# Patient Record
Sex: MALE | Race: BLACK OR AFRICAN AMERICAN | Employment: FULL TIME | URBAN - METROPOLITAN AREA
[De-identification: names, ages, dates, MRNs, and addresses within clinical notes are randomized per-mention and may not be internally consistent; named-entity substitution may affect disease eponyms.]

---

## 2017-01-31 ENCOUNTER — TELEPHONE (OUTPATIENT)
Dept: FAMILY MEDICINE CLINIC | Facility: CLINIC | Age: 57
End: 2017-01-31

## 2017-01-31 DIAGNOSIS — H40.1192 MODERATE STAGE CHRONIC OPEN ANGLE GLAUCOMA: Primary | ICD-10-CM

## 2017-01-31 NOTE — TELEPHONE ENCOUNTER
----- Message from [address] [place] [address] [address] Generic sent at 1/31/2017 10:18 AM CST -----  Regarding: RE:Ophthalmology referral  Contact: [phone]  I have an appointment scheduled with Dr. Dariel Louis this Friday 2/3/17 at 9:30 and I need another referral.  ----- Message --

## 2017-04-30 ENCOUNTER — APPOINTMENT (OUTPATIENT)
Dept: GENERAL RADIOLOGY | Age: 57
End: 2017-04-30
Payer: COMMERCIAL

## 2017-04-30 ENCOUNTER — HOSPITAL ENCOUNTER (EMERGENCY)
Age: 57
Discharge: HOME | End: 2017-04-30
Attending: EMERGENCY MEDICINE
Payer: COMMERCIAL

## 2017-04-30 VITALS
OXYGEN SATURATION: 99 % | DIASTOLIC BLOOD PRESSURE: 105 MMHG | HEART RATE: 75 BPM | SYSTOLIC BLOOD PRESSURE: 162 MMHG | WEIGHT: 196 LBS | BODY MASS INDEX: 23.87 KG/M2 | RESPIRATION RATE: 18 BRPM | HEIGHT: 76 IN | TEMPERATURE: 97 F

## 2017-04-30 DIAGNOSIS — M23.91 INTERNAL DERANGEMENT OF KNEE, RIGHT: Primary | ICD-10-CM

## 2017-04-30 PROCEDURE — 99283 EMERGENCY DEPT VISIT LOW MDM: CPT

## 2017-04-30 PROCEDURE — 73562 X-RAY EXAM OF KNEE 3: CPT

## 2017-04-30 RX ORDER — IBUPROFEN 600 MG/1
600 TABLET ORAL EVERY 8 HOURS PRN
Qty: 30 TABLET | Refills: 0 | Status: SHIPPED | OUTPATIENT
Start: 2017-04-30 | End: 2017-05-07

## 2017-04-30 RX ORDER — HYDROCODONE BITARTRATE AND ACETAMINOPHEN 5; 325 MG/1; MG/1
1-2 TABLET ORAL EVERY 4 HOURS PRN
Qty: 30 TABLET | Refills: 0 | Status: SHIPPED | OUTPATIENT
Start: 2017-04-30 | End: 2017-05-07

## 2017-04-30 RX ORDER — HYDROCODONE BITARTRATE AND ACETAMINOPHEN 5; 325 MG/1; MG/1
2 TABLET ORAL ONCE
Status: COMPLETED | OUTPATIENT
Start: 2017-04-30 | End: 2017-04-30

## 2017-05-01 ENCOUNTER — OFFICE VISIT (OUTPATIENT)
Dept: FAMILY MEDICINE CLINIC | Facility: CLINIC | Age: 57
End: 2017-05-01

## 2017-05-01 ENCOUNTER — TELEPHONE (OUTPATIENT)
Dept: FAMILY MEDICINE CLINIC | Facility: CLINIC | Age: 57
End: 2017-05-01

## 2017-05-01 VITALS
SYSTOLIC BLOOD PRESSURE: 138 MMHG | HEIGHT: 75 IN | DIASTOLIC BLOOD PRESSURE: 80 MMHG | HEART RATE: 76 BPM | OXYGEN SATURATION: 99 % | TEMPERATURE: 98 F | RESPIRATION RATE: 12 BRPM | BODY MASS INDEX: 23.38 KG/M2 | WEIGHT: 188 LBS

## 2017-05-01 DIAGNOSIS — M25.561 ACUTE PAIN OF RIGHT KNEE: Primary | ICD-10-CM

## 2017-05-01 PROCEDURE — 99213 OFFICE O/P EST LOW 20 MIN: CPT | Performed by: FAMILY MEDICINE

## 2017-05-01 NOTE — PROGRESS NOTES
HPI:    Patient ID: Femi Gomez is a 49year old male. HPI  Patient presents with:  ER F/U: 4/30- right knee pain    Patient is here for follow-up of ER visit where he was sitting and went to stand and he twisted his right knee and felt a pop.   He had MS: Right knee with no effusion, no tenderness to palpation the anterior posterior lateral aspects knee range of motion with flexion extension moderately limited due to pain and stiffness         ASSESSMENT/PLAN:   Acute pain of right knee  (primary enco

## 2017-05-01 NOTE — TELEPHONE ENCOUNTER
Reason for the order/referral: ED f/u right knee   PCP: Melody Mcardle   Refer to Provider (first and last name): Dr. Daniel Dixon   Specialty: Ortho   Patient Insurance: Payor: Christian Ortiz / Plan: Edgardo Bon / Product Type: HMO /   Duration/Summary o

## 2017-05-01 NOTE — ED PROVIDER NOTES
Patient Seen in: [facility] [place]    History   Patient presents with:  Lower Extremity Injury (musculoskeletal)    Stated Complaint: right knee injury    HPI    61-year-old male presents to the emergency department stating that h 04/30/17 1700 Temporal   SpO2 04/30/17 1700 99 %   O2 Device 04/30/17 1700 None (Room air)       Current:/105 mmHg  Pulse 75  Temp(Src) 97.3 °F (36.3 °C) (Temporal)  Resp 18  Ht 193 cm (6' 4\")  Wt 88.905 kg  BMI 23.87 kg/m2  SpO2 99%        Physical R-0

## 2017-05-02 PROBLEM — S89.91XA KNEE INJURY, RIGHT, INITIAL ENCOUNTER: Status: ACTIVE | Noted: 2017-05-02

## 2017-05-02 PROBLEM — M25.461 KNEE EFFUSION, RIGHT: Status: ACTIVE | Noted: 2017-05-02

## 2017-05-09 ENCOUNTER — OFFICE VISIT (OUTPATIENT)
Dept: FAMILY MEDICINE CLINIC | Facility: CLINIC | Age: 57
End: 2017-05-09

## 2017-05-09 ENCOUNTER — APPOINTMENT (OUTPATIENT)
Dept: LAB | Age: 57
End: 2017-05-09
Attending: FAMILY MEDICINE
Payer: COMMERCIAL

## 2017-05-09 ENCOUNTER — LAB ENCOUNTER (OUTPATIENT)
Dept: LAB | Age: 57
End: 2017-05-09
Attending: FAMILY MEDICINE
Payer: COMMERCIAL

## 2017-05-09 VITALS
TEMPERATURE: 99 F | SYSTOLIC BLOOD PRESSURE: 130 MMHG | OXYGEN SATURATION: 98 % | WEIGHT: 186 LBS | BODY MASS INDEX: 23.13 KG/M2 | HEIGHT: 75 IN | DIASTOLIC BLOOD PRESSURE: 78 MMHG | RESPIRATION RATE: 12 BRPM | HEART RATE: 87 BPM

## 2017-05-09 DIAGNOSIS — M25.461 KNEE EFFUSION, RIGHT: ICD-10-CM

## 2017-05-09 DIAGNOSIS — S89.91XA KNEE INJURY, RIGHT, INITIAL ENCOUNTER: ICD-10-CM

## 2017-05-09 DIAGNOSIS — Z01.818 PRE-OP TESTING: ICD-10-CM

## 2017-05-09 DIAGNOSIS — S83.206A ACUTE MENISCAL TEAR OF KNEE, RIGHT, INITIAL ENCOUNTER: Primary | ICD-10-CM

## 2017-05-09 DIAGNOSIS — Z01.818 PRE-OP TESTING: Primary | ICD-10-CM

## 2017-05-09 PROCEDURE — 85025 COMPLETE CBC W/AUTO DIFF WBC: CPT

## 2017-05-09 PROCEDURE — 36415 COLL VENOUS BLD VENIPUNCTURE: CPT

## 2017-05-09 PROCEDURE — 85730 THROMBOPLASTIN TIME PARTIAL: CPT

## 2017-05-09 PROCEDURE — 85610 PROTHROMBIN TIME: CPT

## 2017-05-09 PROCEDURE — 99214 OFFICE O/P EST MOD 30 MIN: CPT | Performed by: FAMILY MEDICINE

## 2017-05-09 PROCEDURE — 93005 ELECTROCARDIOGRAM TRACING: CPT

## 2017-05-09 PROCEDURE — 80053 COMPREHEN METABOLIC PANEL: CPT

## 2017-05-09 PROCEDURE — 93010 ELECTROCARDIOGRAM REPORT: CPT | Performed by: INTERNAL MEDICINE

## 2017-05-10 RX ORDER — ASCORBIC ACID 500 MG
500 TABLET ORAL DAILY
COMMUNITY

## 2017-05-10 NOTE — PROGRESS NOTES
HPI:    Patient ID: Amber Dave is a 49year old male. HPI  Patient is here for preop evaluation for right knee meniscal tear surgery.   Patient feels well and has no complaints of chest pain shortness of breath or any other concerns except for right surgery. No orders of the defined types were placed in this encounter.        Meds This Visit:  No prescriptions requested or ordered in this encounter    Imaging & Referrals:  None       TANISHA#[id number]

## 2017-05-15 ENCOUNTER — HOSPITAL ENCOUNTER (OUTPATIENT)
Facility: HOSPITAL | Age: 57
Setting detail: HOSPITAL OUTPATIENT SURGERY
Discharge: HOME | End: 2017-05-15
Attending: ORTHOPAEDIC SURGERY | Admitting: ORTHOPAEDIC SURGERY
Payer: COMMERCIAL

## 2017-05-15 ENCOUNTER — SURGERY (OUTPATIENT)
Age: 57
End: 2017-05-15

## 2017-05-15 VITALS
BODY MASS INDEX: 23 KG/M2 | HEIGHT: 75 IN | SYSTOLIC BLOOD PRESSURE: 134 MMHG | HEART RATE: 59 BPM | TEMPERATURE: 98 F | DIASTOLIC BLOOD PRESSURE: 96 MMHG | RESPIRATION RATE: 15 BRPM | WEIGHT: 185 LBS | OXYGEN SATURATION: 100 %

## 2017-05-15 PROCEDURE — 0SBC4ZZ EXCISION OF RIGHT KNEE JOINT, PERCUTANEOUS ENDOSCOPIC APPROACH: ICD-10-PCS | Performed by: ORTHOPAEDIC SURGERY

## 2017-05-15 RX ORDER — SODIUM CHLORIDE, SODIUM LACTATE, POTASSIUM CHLORIDE, CALCIUM CHLORIDE 600; 310; 30; 20 MG/100ML; MG/100ML; MG/100ML; MG/100ML
INJECTION, SOLUTION INTRAVENOUS CONTINUOUS
Status: DISCONTINUED | OUTPATIENT
Start: 2017-05-15 | End: 2017-05-15

## 2017-05-15 RX ORDER — MEPERIDINE HYDROCHLORIDE 25 MG/ML
12.5 INJECTION INTRAMUSCULAR; INTRAVENOUS; SUBCUTANEOUS AS NEEDED
Status: DISCONTINUED | OUTPATIENT
Start: 2017-05-15 | End: 2017-05-15

## 2017-05-15 RX ORDER — BUPIVACAINE HYDROCHLORIDE 5 MG/ML
INJECTION, SOLUTION EPIDURAL; INTRACAUDAL AS NEEDED
Status: DISCONTINUED | OUTPATIENT
Start: 2017-05-15 | End: 2017-05-15 | Stop reason: HOSPADM

## 2017-05-15 RX ORDER — METOCLOPRAMIDE HYDROCHLORIDE 5 MG/ML
10 INJECTION INTRAMUSCULAR; INTRAVENOUS AS NEEDED
Status: DISCONTINUED | OUTPATIENT
Start: 2017-05-15 | End: 2017-05-15

## 2017-05-15 RX ORDER — NALOXONE HYDROCHLORIDE 0.4 MG/ML
80 INJECTION, SOLUTION INTRAMUSCULAR; INTRAVENOUS; SUBCUTANEOUS AS NEEDED
Status: DISCONTINUED | OUTPATIENT
Start: 2017-05-15 | End: 2017-05-15

## 2017-05-15 RX ORDER — DEXAMETHASONE SODIUM PHOSPHATE 4 MG/ML
4 VIAL (ML) INJECTION AS NEEDED
Status: DISCONTINUED | OUTPATIENT
Start: 2017-05-15 | End: 2017-05-15

## 2017-05-15 RX ORDER — LABETALOL HYDROCHLORIDE 5 MG/ML
5 INJECTION, SOLUTION INTRAVENOUS EVERY 5 MIN PRN
Status: DISCONTINUED | OUTPATIENT
Start: 2017-05-15 | End: 2017-05-15

## 2017-05-15 RX ORDER — MIDAZOLAM HYDROCHLORIDE 1 MG/ML
1 INJECTION INTRAMUSCULAR; INTRAVENOUS EVERY 5 MIN PRN
Status: DISCONTINUED | OUTPATIENT
Start: 2017-05-15 | End: 2017-05-15

## 2017-05-15 RX ORDER — ACETAMINOPHEN 500 MG
1000 TABLET ORAL ONCE AS NEEDED
Status: DISCONTINUED | OUTPATIENT
Start: 2017-05-15 | End: 2017-05-15

## 2017-05-15 RX ORDER — HYDROCODONE BITARTRATE AND ACETAMINOPHEN 5; 325 MG/1; MG/1
2 TABLET ORAL AS NEEDED
Status: DISCONTINUED | OUTPATIENT
Start: 2017-05-15 | End: 2017-05-15

## 2017-05-15 RX ORDER — HYDROCODONE BITARTRATE AND ACETAMINOPHEN 5; 325 MG/1; MG/1
1 TABLET ORAL AS NEEDED
Status: DISCONTINUED | OUTPATIENT
Start: 2017-05-15 | End: 2017-05-15

## 2017-05-15 RX ORDER — DIPHENHYDRAMINE HYDROCHLORIDE 50 MG/ML
12.5 INJECTION INTRAMUSCULAR; INTRAVENOUS AS NEEDED
Status: DISCONTINUED | OUTPATIENT
Start: 2017-05-15 | End: 2017-05-15

## 2017-05-15 RX ORDER — HYDROMORPHONE HYDROCHLORIDE 1 MG/ML
0.4 INJECTION, SOLUTION INTRAMUSCULAR; INTRAVENOUS; SUBCUTANEOUS EVERY 5 MIN PRN
Status: DISCONTINUED | OUTPATIENT
Start: 2017-05-15 | End: 2017-05-15

## 2017-05-15 RX ORDER — ONDANSETRON 2 MG/ML
4 INJECTION INTRAMUSCULAR; INTRAVENOUS AS NEEDED
Status: DISCONTINUED | OUTPATIENT
Start: 2017-05-15 | End: 2017-05-15

## 2017-05-15 NOTE — INTERVAL H&P NOTE
Pre-op Diagnosis: RIGHT KNEE MEDIAL MENISCUS TEAR    The above referenced H&P was reviewed by Tereasa Dakin, MD on 5/15/2017, the patient was examined and no significant changes have occurred in the patient's condition since the H&P was performed.   I dis

## 2017-05-15 NOTE — BRIEF OP NOTE
Pre-Operative Diagnosis: RIGHT KNEE MEDIAL MENISCUS TEAR     Post-Operative Diagnosis: RIGHT KNEE MEDIAL MENISCUS TEAR     Procedure Performed:   Procedure(s):  RIGHT KNEE ARTHROSCOPY WITH PARTIAL LATERAL MENISCECTOMY    Surgeon(s) and Role:     * Jamarcus

## 2017-05-15 NOTE — OPERATIVE REPORT
[facility]    PATIENT'S NAME: Theodore Moralessal   ATTENDING PHYSICIAN: Yobany Garvey M.D. OPERATING PHYSICIAN: Yobany Garvey M.D.    PATIENT ACCOUNT#:   [de-identified]    LOCATION:  OR  OR POOL ROOMS 1 EDWP 10  MEDICAL RECORD #:   [id number]       DA postanesthesia care in stable condition.     Dictated By Emily Chris M.D.  d: 05/15/2017 07:57:04  t: 05/15/2017 08:03:02  [facility] [id number]  [facility]/

## 2017-05-31 ENCOUNTER — OFFICE VISIT (OUTPATIENT)
Dept: PHYSICAL THERAPY | Age: 57
End: 2017-05-31
Attending: ORTHOPAEDIC SURGERY
Payer: COMMERCIAL

## 2017-05-31 DIAGNOSIS — M25.461 EFFUSION OF RIGHT KNEE JOINT: Primary | ICD-10-CM

## 2017-05-31 PROCEDURE — 97530 THERAPEUTIC ACTIVITIES: CPT

## 2017-05-31 PROCEDURE — 97161 PT EVAL LOW COMPLEX 20 MIN: CPT

## 2017-05-31 NOTE — PROGRESS NOTES
LOWER EXTREMITY EVALUATION:   Referring Physician:     Diagnosis: Right Knee Arthroscope     Date of Service: 5/31/2017     PATIENT SUMMARY   Myrna Jimenez is a 49year old y/o male who presents to therapy today with complaints of right sided kn palpation at the incisions. Has a generalized increase in edema and effusion. Sensation: Intact bilaterally. AROM: Active and passive ROM is full for all planes of motion. Accessory motion: Mild loss of medial patellar glides.      Flexibility: Dept: [phone]    Sincerely,  Electronically signed by therapist:     Jean León.  Lana Galeana, PT, DPT, FAAOMPT

## 2017-06-01 ENCOUNTER — APPOINTMENT (OUTPATIENT)
Dept: PHYSICAL THERAPY | Age: 57
End: 2017-06-01
Attending: ORTHOPAEDIC SURGERY
Payer: COMMERCIAL

## 2017-06-05 ENCOUNTER — OFFICE VISIT (OUTPATIENT)
Dept: PHYSICAL THERAPY | Age: 57
End: 2017-06-05
Attending: ORTHOPAEDIC SURGERY
Payer: COMMERCIAL

## 2017-06-05 PROCEDURE — 97110 THERAPEUTIC EXERCISES: CPT

## 2017-06-05 PROCEDURE — 97140 MANUAL THERAPY 1/> REGIONS: CPT

## 2017-06-05 PROCEDURE — 97112 NEUROMUSCULAR REEDUCATION: CPT

## 2017-06-05 NOTE — PROGRESS NOTES
Dx: Right knee arthroscopy         Authorized # of Visits:  8         Next MD visit: none scheduled  Fall Risk: standard         Precautions: n/a             Subjective: The patient states that the knee pain is low.   Cannot recall the HEP we issued last se

## 2017-06-07 ENCOUNTER — OFFICE VISIT (OUTPATIENT)
Dept: PHYSICAL THERAPY | Age: 57
End: 2017-06-07
Attending: ORTHOPAEDIC SURGERY
Payer: COMMERCIAL

## 2017-06-07 PROCEDURE — 97112 NEUROMUSCULAR REEDUCATION: CPT

## 2017-06-07 PROCEDURE — 97140 MANUAL THERAPY 1/> REGIONS: CPT

## 2017-06-07 PROCEDURE — 97110 THERAPEUTIC EXERCISES: CPT

## 2017-06-07 NOTE — PROGRESS NOTES
Dx: Right knee arthroscopy         Authorized # of Visits:  8         Next MD visit: none scheduled  Fall Risk: standard         Precautions: n/a             Subjective: The patient states that the knee pain is low.   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 Plan:Await to hear from the patient. Charges: 2 TherEx; 1 Neuro Clark (10 min);  1 Manual PT (10 min)      Total Timed Treatment: 45 min  Total Treatment Time: 45 min

## 2017-09-29 ENCOUNTER — LAB ENCOUNTER (OUTPATIENT)
Dept: LAB | Age: 57
End: 2017-09-29
Attending: FAMILY MEDICINE
Payer: COMMERCIAL

## 2017-09-29 ENCOUNTER — OFFICE VISIT (OUTPATIENT)
Dept: FAMILY MEDICINE CLINIC | Facility: CLINIC | Age: 57
End: 2017-09-29

## 2017-09-29 VITALS
HEIGHT: 75 IN | HEART RATE: 68 BPM | SYSTOLIC BLOOD PRESSURE: 120 MMHG | RESPIRATION RATE: 16 BRPM | DIASTOLIC BLOOD PRESSURE: 80 MMHG | BODY MASS INDEX: 23.62 KG/M2 | TEMPERATURE: 98 F | WEIGHT: 190 LBS

## 2017-09-29 DIAGNOSIS — N52.9 ERECTILE DYSFUNCTION, UNSPECIFIED ERECTILE DYSFUNCTION TYPE: ICD-10-CM

## 2017-09-29 DIAGNOSIS — Z00.00 ROUTINE ADULT HEALTH MAINTENANCE: ICD-10-CM

## 2017-09-29 DIAGNOSIS — Z00.00 ROUTINE ADULT HEALTH MAINTENANCE: Primary | ICD-10-CM

## 2017-09-29 DIAGNOSIS — Z11.59 NEED FOR HEPATITIS C SCREENING TEST: ICD-10-CM

## 2017-09-29 PROCEDURE — 80053 COMPREHEN METABOLIC PANEL: CPT

## 2017-09-29 PROCEDURE — 86803 HEPATITIS C AB TEST: CPT

## 2017-09-29 PROCEDURE — 85025 COMPLETE CBC W/AUTO DIFF WBC: CPT

## 2017-09-29 PROCEDURE — 80061 LIPID PANEL: CPT

## 2017-09-29 PROCEDURE — 84402 ASSAY OF FREE TESTOSTERONE: CPT

## 2017-09-29 PROCEDURE — 84153 ASSAY OF PSA TOTAL: CPT

## 2017-09-29 PROCEDURE — 99396 PREV VISIT EST AGE 40-64: CPT | Performed by: FAMILY MEDICINE

## 2017-09-29 PROCEDURE — 84403 ASSAY OF TOTAL TESTOSTERONE: CPT

## 2017-09-29 PROCEDURE — 84439 ASSAY OF FREE THYROXINE: CPT

## 2017-09-29 PROCEDURE — 36415 COLL VENOUS BLD VENIPUNCTURE: CPT

## 2017-09-29 PROCEDURE — 84443 ASSAY THYROID STIM HORMONE: CPT

## 2017-10-02 NOTE — PROGRESS NOTES
Des Cowart is a 56year old male who presents for a complete physical exam.   HPI:   Pt complains of nothing.   Urination changes no  ED symptoms no  Immunizations needed no  Wt Readings from Last 6 Encounters:  09/29/17 : 190 lb  05/15/17 : 185 lb  05/ feels well overall, denies fever or chills, denies change in weight  SKIN: denies any unusual skin lesions  EYES: denies changes in vision  HENT: denies upper respiratory symptoms  LUNGS: denies FARHAN, wheezing, cough, orthopnea and PND  CARDIOVASCULAR: sarah edema  NEURO: A&O x3, CN II-XII grossly intact. Reflexes: biceps and patellar 2+ b/l and symmetric.  Gait is normal.  PSYCH: normal affect, no apparent thought disorder, average judgement and insight      Immunization History  Administered            Date(s

## 2017-10-09 ENCOUNTER — TELEPHONE (OUTPATIENT)
Dept: FAMILY MEDICINE CLINIC | Facility: CLINIC | Age: 57
End: 2017-10-09

## 2017-10-09 DIAGNOSIS — D64.9 LOW HEMOGLOBIN: Primary | ICD-10-CM

## 2017-10-09 NOTE — TELEPHONE ENCOUNTER
Spoke to pt regarding lab results in detail. Referral placed to [address] [place] [address] per dr. Sabina Gayle recommendations. All questions answered.

## 2017-10-18 ENCOUNTER — APPOINTMENT (OUTPATIENT)
Dept: HEMATOLOGY/ONCOLOGY | Age: 57
End: 2017-10-18
Attending: INTERNAL MEDICINE
Payer: COMMERCIAL

## 2017-10-19 ENCOUNTER — OFFICE VISIT (OUTPATIENT)
Dept: HEMATOLOGY/ONCOLOGY | Age: 57
End: 2017-10-19
Attending: INTERNAL MEDICINE
Payer: COMMERCIAL

## 2017-10-19 DIAGNOSIS — D64.9 ANEMIA, UNSPECIFIED TYPE: Primary | ICD-10-CM

## 2017-10-19 PROCEDURE — 99244 OFF/OP CNSLTJ NEW/EST MOD 40: CPT | Performed by: INTERNAL MEDICINE

## 2017-10-19 NOTE — CONSULTS
Cancer Center Report of Consultation    Patient Name: Mary Lou Dillon   YOB: 1960   Medical Record Number: [id number]   CSN: [id number]   Consulting Physician: William Mathis M.D.    Referring Physician: Charmayne Devoid    Date of Consultat Travoprost, LUISANA Free, (TRAVATAN Z) 0.004 % Ophthalmic Solution, Apply 1 drop to eye nightly., Disp: 1 Bottle, Rfl: 6  •  latanoprost 0.005 % Ophthalmic Solution, Place 1 drop into both eyes nightly., Disp: 2.5 mL, Rfl: 5  •  Vitamin C 500 MG Oral Tab, Take appropriate. Appears close to chronological age. Well nourished. Well developed. Eyes Normal - Conjunctivae and sclerae are clear and without icterus. Pupils are reactive and equal.   ENMT Normal - Sinuses are nontender.   No oral exudates, ulcers, masses Range: 80.0 - 99.0 fL 86.1 88.4 87.5 86.4   RDW Latest Ref Range: 11.5 - 16.0 % 14.6 14.5 14.5 14.5   RDW-SD Latest Ref Range: 35.1 - 46.3 fL  46.7 (H) 46.6 (H) 46.1   MPV Latest Ref Range: 7.5 - 11.5 fL 7.8      Prelim Neutrophil Abs Latest Ref Range: 1.3 46 48   AST (SGOT) Latest Ref Range: 15 - 41 U/L 17 21   ALT (SGPT) Latest Ref Range: 17 - 63 U/L 20 19   Total Bilirubin Latest Ref Range: 0.1 - 2.0 mg/dL 0.2 0.5       Radiology:    Pathology:    Impression and Plan:  Anemia: Unclear etiology.  The patien

## 2017-10-30 ENCOUNTER — TELEPHONE (OUTPATIENT)
Dept: FAMILY MEDICINE CLINIC | Facility: CLINIC | Age: 57
End: 2017-10-30

## 2017-10-30 DIAGNOSIS — D64.9 LOW HEMOGLOBIN: Primary | ICD-10-CM

## 2017-10-30 NOTE — TELEPHONE ENCOUNTER
Referral placed, tried calling Lorena at the cancer center, phones already in answering service, will try again.

## 2017-10-30 NOTE — TELEPHONE ENCOUNTER
Called from the [facility]. Patient will need referral for Dr. Anika Gant for a follow-up. Patient has an appt this Thursday, 11-2-17.

## 2017-11-02 ENCOUNTER — OFFICE VISIT (OUTPATIENT)
Dept: HEMATOLOGY/ONCOLOGY | Age: 57
End: 2017-11-02
Attending: INTERNAL MEDICINE
Payer: COMMERCIAL

## 2017-11-02 VITALS
WEIGHT: 186 LBS | DIASTOLIC BLOOD PRESSURE: 89 MMHG | TEMPERATURE: 98 F | BODY MASS INDEX: 23 KG/M2 | HEART RATE: 90 BPM | SYSTOLIC BLOOD PRESSURE: 137 MMHG | RESPIRATION RATE: 18 BRPM

## 2017-11-02 DIAGNOSIS — D64.9 LOW HEMOGLOBIN: ICD-10-CM

## 2017-11-02 PROCEDURE — 99213 OFFICE O/P EST LOW 20 MIN: CPT | Performed by: INTERNAL MEDICINE

## 2017-11-02 NOTE — PATIENT INSTRUCTIONS
Contact Information    Monday-Friday 8:30-4:00   Dr. Rajan Castañeda [phone]    After hours and on Weekends:   Emergency number  [place]     To make an Appointment with Dr. Swathi Bhatia [phone]

## 2018-08-01 ENCOUNTER — TELEPHONE (OUTPATIENT)
Dept: FAMILY MEDICINE CLINIC | Facility: CLINIC | Age: 58
End: 2018-08-01

## 2018-08-01 DIAGNOSIS — H40.1192 MODERATE STAGE CHRONIC OPEN ANGLE GLAUCOMA, UNSPECIFIED LATERALITY: Primary | ICD-10-CM

## 2018-08-01 NOTE — TELEPHONE ENCOUNTER
Pt needs referral for eye dr, Dr Demarco Bundy out of [facility].  He has an appt on Friday

## 2018-08-01 NOTE — TELEPHONE ENCOUNTER
Requesting Referral to regular eye doctor  LOV: 9/29/17  RTC: not noted    Future Appointments  Date Time Provider Zach Tricia   8/3/2018 11:00 AM Ngoc Aragon MD [facility] [place]   8/31/2018 7:00 AM Parris Roa MD [facility] [address]

## 2018-08-31 ENCOUNTER — LAB ENCOUNTER (OUTPATIENT)
Dept: LAB | Age: 58
End: 2018-08-31
Attending: FAMILY MEDICINE
Payer: COMMERCIAL

## 2018-08-31 ENCOUNTER — OFFICE VISIT (OUTPATIENT)
Dept: FAMILY MEDICINE CLINIC | Facility: CLINIC | Age: 58
End: 2018-08-31

## 2018-08-31 VITALS
BODY MASS INDEX: 21.51 KG/M2 | WEIGHT: 173 LBS | SYSTOLIC BLOOD PRESSURE: 122 MMHG | HEART RATE: 70 BPM | RESPIRATION RATE: 16 BRPM | DIASTOLIC BLOOD PRESSURE: 82 MMHG | HEIGHT: 75 IN

## 2018-08-31 DIAGNOSIS — N52.9 ERECTILE DYSFUNCTION, UNSPECIFIED ERECTILE DYSFUNCTION TYPE: ICD-10-CM

## 2018-08-31 DIAGNOSIS — Z12.5 PROSTATE CANCER SCREENING: ICD-10-CM

## 2018-08-31 DIAGNOSIS — Z00.00 LABORATORY EXAMINATION ORDERED AS PART OF A ROUTINE GENERAL MEDICAL EXAMINATION: ICD-10-CM

## 2018-08-31 DIAGNOSIS — R39.198 SUBJECTIVE CHANGE IN URINATION: ICD-10-CM

## 2018-08-31 DIAGNOSIS — Z00.00 WELLNESS EXAMINATION: Primary | ICD-10-CM

## 2018-08-31 DIAGNOSIS — Z99.89 OSA ON CPAP: ICD-10-CM

## 2018-08-31 DIAGNOSIS — G47.33 OSA ON CPAP: ICD-10-CM

## 2018-08-31 DIAGNOSIS — R39.11 HESITANCY: ICD-10-CM

## 2018-08-31 DIAGNOSIS — D64.9 NORMOCYTIC ANEMIA: ICD-10-CM

## 2018-08-31 PROBLEM — M25.461 KNEE EFFUSION, RIGHT: Status: RESOLVED | Noted: 2017-05-02 | Resolved: 2018-08-31

## 2018-08-31 PROBLEM — S89.91XA KNEE INJURY, RIGHT, INITIAL ENCOUNTER: Status: RESOLVED | Noted: 2017-05-02 | Resolved: 2018-08-31

## 2018-08-31 LAB
ALBUMIN SERPL-MCNC: 3.7 G/DL (ref 3.5–4.8)
ALBUMIN/GLOB SERPL: 1.1 {RATIO} (ref 1–2)
ALP LIVER SERPL-CCNC: 52 U/L (ref 45–117)
ALT SERPL-CCNC: 25 U/L (ref 17–63)
ANION GAP SERPL CALC-SCNC: 8 MMOL/L (ref 0–18)
AST SERPL-CCNC: 26 U/L (ref 15–41)
BASOPHILS # BLD AUTO: 0.08 X10(3) UL (ref 0–0.1)
BASOPHILS NFR BLD AUTO: 1.1 %
BILIRUB SERPL-MCNC: 0.6 MG/DL (ref 0.1–2)
BUN BLD-MCNC: 12 MG/DL (ref 8–20)
BUN/CREAT SERPL: 12 (ref 10–20)
CALCIUM BLD-MCNC: 8.8 MG/DL (ref 8.3–10.3)
CHLORIDE SERPL-SCNC: 108 MMOL/L (ref 101–111)
CHOLEST SMN-MCNC: 124 MG/DL (ref ?–200)
CO2 SERPL-SCNC: 26 MMOL/L (ref 22–32)
COMPLEXED PSA SERPL-MCNC: 0.74 NG/ML (ref 0.01–4)
CREAT BLD-MCNC: 1 MG/DL (ref 0.7–1.3)
DEPRECATED HBV CORE AB SER IA-ACNC: 59.6 NG/ML (ref 30–530)
EOSINOPHIL # BLD AUTO: 0.2 X10(3) UL (ref 0–0.3)
EOSINOPHIL NFR BLD AUTO: 2.7 %
ERYTHROCYTE [DISTWIDTH] IN BLOOD BY AUTOMATED COUNT: 14.2 % (ref 11.5–16)
GLOBULIN PLAS-MCNC: 3.5 G/DL (ref 2.5–4)
GLUCOSE BLD-MCNC: 73 MG/DL (ref 70–99)
HCT VFR BLD AUTO: 40.3 % (ref 37–53)
HDLC SERPL-MCNC: 75 MG/DL (ref 40–59)
HGB BLD-MCNC: 13.2 G/DL (ref 13–17)
IMMATURE GRANULOCYTE COUNT: 0.03 X10(3) UL (ref 0–1)
IMMATURE GRANULOCYTE RATIO %: 0.4 %
IRON SATURATION: 18 % (ref 20–50)
IRON: 76 UG/DL (ref 45–182)
LDLC SERPL CALC-MCNC: 42 MG/DL (ref ?–100)
LYMPHOCYTES # BLD AUTO: 2.61 X10(3) UL (ref 0.9–4)
LYMPHOCYTES NFR BLD AUTO: 34.9 %
M PROTEIN MFR SERPL ELPH: 7.2 G/DL (ref 6.1–8.3)
MCH RBC QN AUTO: 29 PG (ref 27–33.2)
MCHC RBC AUTO-ENTMCNC: 32.8 G/DL (ref 31–37)
MCV RBC AUTO: 88.6 FL (ref 80–99)
MONOCYTES # BLD AUTO: 0.69 X10(3) UL (ref 0.1–1)
MONOCYTES NFR BLD AUTO: 9.2 %
NEUTROPHIL ABS PRELIM: 3.87 X10 (3) UL (ref 1.3–6.7)
NEUTROPHILS # BLD AUTO: 3.87 X10(3) UL (ref 1.3–6.7)
NEUTROPHILS NFR BLD AUTO: 51.7 %
NONHDLC SERPL-MCNC: 49 MG/DL (ref ?–130)
OSMOLALITY SERPL CALC.SUM OF ELEC: 292 MOSM/KG (ref 275–295)
PLATELET # BLD AUTO: 238 10(3)UL (ref 150–450)
POTASSIUM SERPL-SCNC: 4.3 MMOL/L (ref 3.6–5.1)
RBC # BLD AUTO: 4.55 X10(6)UL (ref 4.3–5.7)
RED CELL DISTRIBUTION WIDTH-SD: 45.8 FL (ref 35.1–46.3)
SODIUM SERPL-SCNC: 142 MMOL/L (ref 136–144)
TOTAL IRON BINDING CAPACITY: 423 UG/DL (ref 240–450)
TRANSFERRIN SERPL-MCNC: 284 MG/DL (ref 200–360)
TRIGL SERPL-MCNC: 37 MG/DL (ref 30–149)
VLDLC SERPL CALC-MCNC: 7 MG/DL (ref 0–30)
WBC # BLD AUTO: 7.5 X10(3) UL (ref 4–13)

## 2018-08-31 PROCEDURE — 82728 ASSAY OF FERRITIN: CPT

## 2018-08-31 PROCEDURE — 84402 ASSAY OF FREE TESTOSTERONE: CPT

## 2018-08-31 PROCEDURE — 83540 ASSAY OF IRON: CPT

## 2018-08-31 PROCEDURE — 83550 IRON BINDING TEST: CPT

## 2018-08-31 PROCEDURE — 36415 COLL VENOUS BLD VENIPUNCTURE: CPT

## 2018-08-31 PROCEDURE — 80053 COMPREHEN METABOLIC PANEL: CPT

## 2018-08-31 PROCEDURE — 80061 LIPID PANEL: CPT

## 2018-08-31 PROCEDURE — 84403 ASSAY OF TOTAL TESTOSTERONE: CPT

## 2018-08-31 PROCEDURE — 99396 PREV VISIT EST AGE 40-64: CPT | Performed by: FAMILY MEDICINE

## 2018-08-31 PROCEDURE — 99214 OFFICE O/P EST MOD 30 MIN: CPT | Performed by: FAMILY MEDICINE

## 2018-08-31 PROCEDURE — 85025 COMPLETE CBC W/AUTO DIFF WBC: CPT

## 2018-08-31 NOTE — PROGRESS NOTES
Patient presents with:  Wellness Visit      HPI:  Thao Munguia is a 56year old male here today for preventative visit.       Imms- up to date, declines pneum shot, says he'll work on stopping smoking first.      Prostate cancer screening- due, + hesitenc Cap Take 200 Units by mouth daily. Disp:  Rfl:    [DISCONTINUED] Travoprost, LUISANA Free, (TRAVATAN Z) 0.004 % Ophthalmic Solution Apply 1 drop to eye nightly. Disp: 1 Bottle Rfl: 6     No current facility-administered medications on file prior to visit.    No nerves 2-12 grossly intact. PSYCHIATRIC:  Normal mood, affect, and hygiene. : normal prostate size without nodules, normal rectal tone    ASSESSMENT/PLAN:    1. Wellness examination    2.  Laboratory examination ordered as part of a routine general med

## 2018-09-05 ENCOUNTER — TELEPHONE (OUTPATIENT)
Dept: FAMILY MEDICINE CLINIC | Facility: CLINIC | Age: 58
End: 2018-09-05

## 2018-09-05 DIAGNOSIS — Z99.89 OSA ON CPAP: Primary | ICD-10-CM

## 2018-09-05 DIAGNOSIS — G47.33 OSA ON CPAP: Primary | ICD-10-CM

## 2018-09-05 NOTE — TELEPHONE ENCOUNTER
Renee Nelson is aware of new order and pt has been scheduled   Future Appointments  Date Time Provider Zach Tricia   9/6/2018 9:00 PM [address] [facility]   2/8/2019 10:00 AM Nika Chowdhury MD [facility]

## 2018-09-05 NOTE — TELEPHONE ENCOUNTER
Melvina Machuca called from the sleep center and stated the provider needs to rewrite the order for the sleep study as it was written incorrectly.  Its states CPAP titration, but pt has not had a sleep study in over 5 years so order needs to start at Diagnostic sleep

## 2018-09-06 ENCOUNTER — OFFICE VISIT (OUTPATIENT)
Dept: SLEEP CENTER | Age: 58
End: 2018-09-06
Attending: FAMILY MEDICINE
Payer: COMMERCIAL

## 2018-09-06 DIAGNOSIS — G47.33 OSA ON CPAP: ICD-10-CM

## 2018-09-06 DIAGNOSIS — Z99.89 OSA ON CPAP: ICD-10-CM

## 2018-09-06 LAB
TESTOSTERONE TOTAL: 403 NG/DL
TESTOSTERONE, FREE -MS/MS: 60.7 PG/ML

## 2018-09-06 PROCEDURE — 95810 POLYSOM 6/> YRS 4/> PARAM: CPT

## 2018-09-13 NOTE — PROCEDURES
[address] [address]       Accredited by the [employer] ([employer]ASM)    PATIENT'S NAME:        Kayla Levi  ATTENDING PHYSICIAN:   Mary Andre M.D. REFERRING PHYSICIAN:   Percy Davidson  stage REM 15%. RESPIRATORY MEASURES:  The patient had a total of 31 obstructive apneic or hypopneic episodes, for an overall apnea-hypopnea index of 6. Supine AHI was 29, lateral AHI was 3. Oxygen saturation zeus was 89%.   The patient also had freque

## 2019-11-13 ENCOUNTER — OFFICE VISIT (OUTPATIENT)
Dept: FAMILY MEDICINE CLINIC | Facility: CLINIC | Age: 59
End: 2019-11-13
Payer: COMMERCIAL

## 2019-11-13 ENCOUNTER — LAB ENCOUNTER (OUTPATIENT)
Dept: LAB | Age: 59
End: 2019-11-13
Attending: FAMILY MEDICINE
Payer: COMMERCIAL

## 2019-11-13 VITALS
BODY MASS INDEX: 22.88 KG/M2 | WEIGHT: 184 LBS | HEIGHT: 75 IN | HEART RATE: 72 BPM | DIASTOLIC BLOOD PRESSURE: 78 MMHG | RESPIRATION RATE: 16 BRPM | TEMPERATURE: 98 F | SYSTOLIC BLOOD PRESSURE: 120 MMHG

## 2019-11-13 DIAGNOSIS — Z00.00 LABORATORY EXAM ORDERED AS PART OF ROUTINE GENERAL MEDICAL EXAMINATION: ICD-10-CM

## 2019-11-13 DIAGNOSIS — N40.1 BENIGN PROSTATIC HYPERPLASIA WITH URINARY HESITANCY: ICD-10-CM

## 2019-11-13 DIAGNOSIS — Z86.010 HISTORY OF COLON POLYPS: ICD-10-CM

## 2019-11-13 DIAGNOSIS — H40.9 GLAUCOMA OF BOTH EYES, UNSPECIFIED GLAUCOMA TYPE: ICD-10-CM

## 2019-11-13 DIAGNOSIS — Z12.5 SCREENING PSA (PROSTATE SPECIFIC ANTIGEN): ICD-10-CM

## 2019-11-13 DIAGNOSIS — F17.210 CIGARETTE SMOKER MOTIVATED TO QUIT: ICD-10-CM

## 2019-11-13 DIAGNOSIS — N52.9 ERECTILE DYSFUNCTION, UNSPECIFIED ERECTILE DYSFUNCTION TYPE: ICD-10-CM

## 2019-11-13 DIAGNOSIS — R39.11 BENIGN PROSTATIC HYPERPLASIA WITH URINARY HESITANCY: ICD-10-CM

## 2019-11-13 DIAGNOSIS — R39.11 URINARY HESITANCY: ICD-10-CM

## 2019-11-13 DIAGNOSIS — Z00.00 ROUTINE MEDICAL EXAM: Primary | ICD-10-CM

## 2019-11-13 DIAGNOSIS — M21.611 BUNION OF RIGHT FOOT: ICD-10-CM

## 2019-11-13 DIAGNOSIS — Z28.21 INFLUENZA VACCINATION DECLINED BY PATIENT: ICD-10-CM

## 2019-11-13 DIAGNOSIS — M79.671 FOOT PAIN, BILATERAL: ICD-10-CM

## 2019-11-13 DIAGNOSIS — M79.672 FOOT PAIN, BILATERAL: ICD-10-CM

## 2019-11-13 PROCEDURE — 84153 ASSAY OF PSA TOTAL: CPT | Performed by: FAMILY MEDICINE

## 2019-11-13 PROCEDURE — 99213 OFFICE O/P EST LOW 20 MIN: CPT | Performed by: FAMILY MEDICINE

## 2019-11-13 PROCEDURE — 85025 COMPLETE CBC W/AUTO DIFF WBC: CPT | Performed by: FAMILY MEDICINE

## 2019-11-13 PROCEDURE — 80061 LIPID PANEL: CPT | Performed by: FAMILY MEDICINE

## 2019-11-13 PROCEDURE — 36415 COLL VENOUS BLD VENIPUNCTURE: CPT | Performed by: FAMILY MEDICINE

## 2019-11-13 PROCEDURE — 99396 PREV VISIT EST AGE 40-64: CPT | Performed by: FAMILY MEDICINE

## 2019-11-13 PROCEDURE — 80053 COMPREHEN METABOLIC PANEL: CPT | Performed by: FAMILY MEDICINE

## 2019-11-13 RX ORDER — TADALAFIL 5 MG/1
5 TABLET ORAL
Qty: 30 TABLET | Refills: 5 | Status: SHIPPED | OUTPATIENT
Start: 2019-11-13 | End: 2020-11-24

## 2019-11-13 NOTE — PROGRESS NOTES
Patient presents with:  Physical  Imm/Inj: declines Influenza vaccine today      HPI:  Aide Stephenson is a 64year old male here today for preventative visit.         Imms- up to date with tdap, declines pneum shot, says he'll work on stopping smoking firs Solution, Apply 1 drop to eye nightly., Disp: 1 Bottle, Rfl: 6  Vitamin C 500 MG Oral Tab, Take 500 mg by mouth daily. , Disp: , Rfl:   Vitamin B-12 250 MCG Oral Tab, Take 250 mcg by mouth daily. , Disp: , Rfl:   vitamin E 200 UNITS Oral Cap, Take 200 Units activity: Not on file    Other Topics      Concerns:         Service: Not Asked        Blood Transfusions: Not Asked        Caffeine Concern: Yes          3 cups per day        Occupational Exposure: Not Asked        Hobby Hazards: Not Asked intact. PSYCHIATRIC:  Normal mood, affect, and hygiene. ASSESSMENT/PLAN:    1. Routine medical exam    2. Screening PSA (prostate specific antigen)  - PSA SCREEN; Future    3.  Laboratory exam ordered as part of routine general medical examination  - C

## 2020-10-06 ENCOUNTER — APPOINTMENT (OUTPATIENT)
Dept: LAB | Age: 60
End: 2020-10-06
Attending: INTERNAL MEDICINE
Payer: COMMERCIAL

## 2020-10-06 DIAGNOSIS — Z01.818 PRE-OP TESTING: ICD-10-CM

## 2020-10-09 PROBLEM — D12.5 BENIGN NEOPLASM OF SIGMOID COLON: Status: ACTIVE | Noted: 2020-10-09

## 2020-10-09 PROBLEM — Z86.0101 HISTORY OF ADENOMATOUS POLYP OF COLON: Status: ACTIVE | Noted: 2020-10-09

## 2020-10-09 PROBLEM — D12.3 BENIGN NEOPLASM OF TRANSVERSE COLON: Status: ACTIVE | Noted: 2020-10-09

## 2020-10-09 PROBLEM — Z86.010 HISTORY OF ADENOMATOUS POLYP OF COLON: Status: ACTIVE | Noted: 2020-10-09

## 2020-11-24 ENCOUNTER — OFFICE VISIT (OUTPATIENT)
Dept: FAMILY MEDICINE CLINIC | Facility: CLINIC | Age: 60
End: 2020-11-24
Payer: COMMERCIAL

## 2020-11-24 VITALS
WEIGHT: 182 LBS | RESPIRATION RATE: 16 BRPM | OXYGEN SATURATION: 98 % | HEART RATE: 78 BPM | HEIGHT: 75 IN | DIASTOLIC BLOOD PRESSURE: 80 MMHG | TEMPERATURE: 98 F | SYSTOLIC BLOOD PRESSURE: 130 MMHG | BODY MASS INDEX: 22.63 KG/M2

## 2020-11-24 DIAGNOSIS — Z28.21 INFLUENZA VACCINATION DECLINED BY PATIENT: ICD-10-CM

## 2020-11-24 DIAGNOSIS — Z12.11 COLON CANCER SCREENING: ICD-10-CM

## 2020-11-24 DIAGNOSIS — N52.9 ERECTILE DYSFUNCTION, UNSPECIFIED ERECTILE DYSFUNCTION TYPE: ICD-10-CM

## 2020-11-24 DIAGNOSIS — Z00.00 ROUTINE MEDICAL EXAM: Primary | ICD-10-CM

## 2020-11-24 DIAGNOSIS — M79.671 FOOT PAIN, BILATERAL: ICD-10-CM

## 2020-11-24 DIAGNOSIS — Z00.00 LABORATORY EXAM ORDERED AS PART OF ROUTINE GENERAL MEDICAL EXAMINATION: ICD-10-CM

## 2020-11-24 DIAGNOSIS — M21.611 BUNION OF RIGHT FOOT: ICD-10-CM

## 2020-11-24 DIAGNOSIS — R39.11 BENIGN PROSTATIC HYPERPLASIA WITH URINARY HESITANCY: ICD-10-CM

## 2020-11-24 DIAGNOSIS — Z72.0 SMOKING TRYING TO QUIT: ICD-10-CM

## 2020-11-24 DIAGNOSIS — N40.1 BENIGN PROSTATIC HYPERPLASIA WITH URINARY HESITANCY: ICD-10-CM

## 2020-11-24 DIAGNOSIS — Z12.5 PROSTATE CANCER SCREENING: ICD-10-CM

## 2020-11-24 DIAGNOSIS — R39.11 URINARY HESITANCY: ICD-10-CM

## 2020-11-24 DIAGNOSIS — M79.672 FOOT PAIN, BILATERAL: ICD-10-CM

## 2020-11-24 DIAGNOSIS — H40.9 GLAUCOMA OF BOTH EYES, UNSPECIFIED GLAUCOMA TYPE: ICD-10-CM

## 2020-11-24 DIAGNOSIS — Z80.42 FAMILY HISTORY OF PROSTATE CANCER: ICD-10-CM

## 2020-11-24 PROCEDURE — 99072 ADDL SUPL MATRL&STAF TM PHE: CPT | Performed by: FAMILY MEDICINE

## 2020-11-24 PROCEDURE — 3079F DIAST BP 80-89 MM HG: CPT | Performed by: FAMILY MEDICINE

## 2020-11-24 PROCEDURE — 99406 BEHAV CHNG SMOKING 3-10 MIN: CPT | Performed by: FAMILY MEDICINE

## 2020-11-24 PROCEDURE — 99396 PREV VISIT EST AGE 40-64: CPT | Performed by: FAMILY MEDICINE

## 2020-11-24 PROCEDURE — 3008F BODY MASS INDEX DOCD: CPT | Performed by: FAMILY MEDICINE

## 2020-11-24 PROCEDURE — 3075F SYST BP GE 130 - 139MM HG: CPT | Performed by: FAMILY MEDICINE

## 2020-11-24 RX ORDER — TADALAFIL 5 MG/1
5 TABLET ORAL
Qty: 9 TABLET | Refills: 0 | Status: SHIPPED | OUTPATIENT
Start: 2020-11-24 | End: 2020-11-24 | Stop reason: CLARIF

## 2020-11-24 RX ORDER — TADALAFIL 5 MG/1
5 TABLET ORAL DAILY
Qty: 30 TABLET | Refills: 0 | Status: SHIPPED | OUTPATIENT
Start: 2020-11-24 | End: 2020-12-31

## 2020-11-24 NOTE — PATIENT INSTRUCTIONS
Prevention Guidelines, Men Ages 48 to 59  Screening tests and vaccines are an important part of managing your health. A screening test is done to find diseases in people who don't have any symptoms.  The goal is to find a disease early so lifestyle change Depression All men in this age group At routine exams   Type 2 diabetes or prediabetes All men beginning at age 39 and men without symptoms at any age who are overweight or obese and have 1 or more other risk factors for diabetes At least every 3 years (ye Chickenpox (varicella) All men in this age group who have no record of this infection or vaccine 2 doses; second dose should be given at least 4 weeks after the first dose   Hepatitis A Men at increased risk for infection 2 or 3 doses (depending on the vac · Recombinant zoster vaccine (RZV; Shigrix) is recommended as the preferred shingles vaccine. It is given in a series of 2 doses.  The 2nd dose is given 2 to 6 months after the first. This is given even if you've had shingles before or had a previous Zoster

## 2020-11-24 NOTE — PROGRESS NOTES
Patient presents with:  Physical  Immunization/Injection: declines the Influenza vaccine today      HPI:  Aaron Us is a 64year old male here today for preventative visit.       Imms- up to date with tdap, declines pneum shot, says he'll work on stopp • KNEE ARTHROSCOPY Right 5/15/2017    Performed by Venkat Pitts MD at [address] [facility] [address]    3 teeth       •  Travoprost, LUIASNA Free, (TRAVATAN Z) 0.004 % Ophthalmic Solution, Apply 1 drop to eye nightly., Disp: 1 Bottle, Rfl: 6 organizations: Not on file        Relationship status: Not on file      Intimate partner violence        Fear of current or ex partner: Not on file        Emotionally abused: Not on file        Physically abused: Not on file        Forced sexual activity: soft, nontender, nondistended. No hepatomegaly. MUSCULOSKELETAL: Strength of upper and lower extremities 5/5 bilaterally. Normal gait. NEUROLOGIC:  Cranial nerves 2-12 grossly intact. PSYCHIATRIC:  Normal mood, affect, and hygiene.    : normal prostate cessation methods discussed with patient and the patient has decided to try the gum        Patient verbalized understanding and agrees to plan. Return in about 1 year (around 11/24/2021) for annual physical, we will call with results.

## 2020-12-10 ENCOUNTER — PATIENT MESSAGE (OUTPATIENT)
Dept: FAMILY MEDICINE CLINIC | Facility: CLINIC | Age: 60
End: 2020-12-10

## 2020-12-10 DIAGNOSIS — Z99.89 OSA ON CPAP: Primary | ICD-10-CM

## 2020-12-10 DIAGNOSIS — G47.33 OSA ON CPAP: Primary | ICD-10-CM

## 2020-12-10 NOTE — TELEPHONE ENCOUNTER
See TE, referral pended for approval/denial, did not see documentation why pt is needing a pulmonology referral.

## 2020-12-10 NOTE — TELEPHONE ENCOUNTER
From: Kate Morrell  To:  Magdi Talbert MD  Sent: 12/10/2020 4:17 PM CST  Subject: Other    I don't have a referral for Dr. Jacob Guzmán

## 2020-12-11 ENCOUNTER — LAB ENCOUNTER (OUTPATIENT)
Dept: LAB | Age: 60
End: 2020-12-11
Attending: ANESTHESIOLOGY
Payer: COMMERCIAL

## 2020-12-11 DIAGNOSIS — N52.9 ERECTILE DYSFUNCTION, UNSPECIFIED ERECTILE DYSFUNCTION TYPE: ICD-10-CM

## 2020-12-11 DIAGNOSIS — Z00.00 LABORATORY EXAM ORDERED AS PART OF ROUTINE GENERAL MEDICAL EXAMINATION: ICD-10-CM

## 2020-12-11 DIAGNOSIS — D64.9 NORMOCYTIC ANEMIA: ICD-10-CM

## 2020-12-11 DIAGNOSIS — R39.11 URINARY HESITANCY: ICD-10-CM

## 2020-12-11 DIAGNOSIS — N40.1 BENIGN PROSTATIC HYPERPLASIA WITH URINARY HESITANCY: ICD-10-CM

## 2020-12-11 DIAGNOSIS — R39.11 BENIGN PROSTATIC HYPERPLASIA WITH URINARY HESITANCY: ICD-10-CM

## 2020-12-11 DIAGNOSIS — Z12.5 PROSTATE CANCER SCREENING: ICD-10-CM

## 2020-12-11 PROCEDURE — 80053 COMPREHEN METABOLIC PANEL: CPT | Performed by: FAMILY MEDICINE

## 2020-12-11 PROCEDURE — 82607 VITAMIN B-12: CPT | Performed by: FAMILY MEDICINE

## 2020-12-11 PROCEDURE — 82728 ASSAY OF FERRITIN: CPT | Performed by: FAMILY MEDICINE

## 2020-12-11 PROCEDURE — 83550 IRON BINDING TEST: CPT | Performed by: FAMILY MEDICINE

## 2020-12-11 PROCEDURE — 84153 ASSAY OF PSA TOTAL: CPT | Performed by: FAMILY MEDICINE

## 2020-12-11 PROCEDURE — 85025 COMPLETE CBC W/AUTO DIFF WBC: CPT | Performed by: FAMILY MEDICINE

## 2020-12-11 PROCEDURE — 80061 LIPID PANEL: CPT | Performed by: FAMILY MEDICINE

## 2020-12-11 PROCEDURE — 36415 COLL VENOUS BLD VENIPUNCTURE: CPT | Performed by: FAMILY MEDICINE

## 2020-12-11 PROCEDURE — 83540 ASSAY OF IRON: CPT | Performed by: FAMILY MEDICINE

## 2020-12-12 DIAGNOSIS — D64.9 NORMOCYTIC ANEMIA: Primary | ICD-10-CM

## 2020-12-23 ENCOUNTER — OFFICE VISIT (OUTPATIENT)
Dept: PODIATRY CLINIC | Facility: CLINIC | Age: 60
End: 2020-12-23
Payer: COMMERCIAL

## 2020-12-23 DIAGNOSIS — M77.42 METATARSALGIA OF BOTH FEET: ICD-10-CM

## 2020-12-23 DIAGNOSIS — R26.2 DIFFICULTY WALKING: ICD-10-CM

## 2020-12-23 DIAGNOSIS — L84 CALLUS OF FOOT: ICD-10-CM

## 2020-12-23 DIAGNOSIS — M79.671 PAIN IN BOTH FEET: ICD-10-CM

## 2020-12-23 DIAGNOSIS — M79.672 PAIN IN BOTH FEET: ICD-10-CM

## 2020-12-23 DIAGNOSIS — M77.41 METATARSALGIA OF BOTH FEET: ICD-10-CM

## 2020-12-23 DIAGNOSIS — M72.2 PLANTAR FASCIITIS OF RIGHT FOOT: ICD-10-CM

## 2020-12-23 DIAGNOSIS — M72.2 PLANTAR FASCIITIS OF LEFT FOOT: ICD-10-CM

## 2020-12-23 PROCEDURE — 11056 PARNG/CUTG B9 HYPRKR LES 2-4: CPT | Performed by: PODIATRIST

## 2020-12-23 NOTE — PROGRESS NOTES
Bart Rodriguez is a 64year old male. Patient presents with:  Consult: Patient is requesting new orthotics. Patient denies new issue or concerns.          HPI:   Patient returns to the clinic stating his orthotics are no longer helping his feet is continuin Hypertension Mother    • Hypertension Maternal Grandmother    • Hypertension Maternal Grandfather    • Hypertension Paternal Grandmother    • Hypertension Paternal Grandfather    • Prostate Cancer Paternal Uncle    • Prostate Cancer Paternal Uncle       So this visit:    Metatarsalgia of both feet    Plantar fasciitis of left foot    Plantar fasciitis of right foot    Callus of foot    Pain in both feet    Difficulty walking        Plan:  Today using a 15 blade trimmed and debrided hyperkeratoses on both feet

## 2020-12-28 ENCOUNTER — OFFICE VISIT (OUTPATIENT)
Dept: PODIATRY CLINIC | Facility: CLINIC | Age: 60
End: 2020-12-28
Payer: COMMERCIAL

## 2020-12-28 DIAGNOSIS — M77.42 METATARSALGIA OF BOTH FEET: Primary | ICD-10-CM

## 2020-12-28 DIAGNOSIS — R39.11 URINARY HESITANCY: ICD-10-CM

## 2020-12-28 DIAGNOSIS — M77.41 METATARSALGIA OF BOTH FEET: Primary | ICD-10-CM

## 2020-12-28 DIAGNOSIS — M72.2 PLANTAR FASCIITIS OF LEFT FOOT: ICD-10-CM

## 2020-12-28 DIAGNOSIS — N40.1 BENIGN PROSTATIC HYPERPLASIA WITH URINARY HESITANCY: ICD-10-CM

## 2020-12-28 DIAGNOSIS — M72.2 PLANTAR FASCIITIS OF RIGHT FOOT: ICD-10-CM

## 2020-12-28 DIAGNOSIS — R39.11 BENIGN PROSTATIC HYPERPLASIA WITH URINARY HESITANCY: ICD-10-CM

## 2020-12-28 PROCEDURE — 29799 UNLISTED PX CASTING/STRPG: CPT | Performed by: PODIATRIST

## 2020-12-28 PROCEDURE — L3000 FT INSERT UCB BERKELEY SHELL: HCPCS | Performed by: PODIATRIST

## 2020-12-29 NOTE — PROGRESS NOTES
Rad Morales is a 64year old male. Patient presents with:  Orthotic Status: here to be cased for orthotics. HPI:     June is here for casting for orthotics      Allergies: Patient has no known allergies.    Current Outpatient Medications   Medicat • Prostate Cancer Paternal Uncle       Social History    Socioeconomic History      Marital status:       Spouse name: Not on file      Number of children: 3      Years of education: Not on file      Highest education level: Not on file    Tobacco arrive for dispensing    The patient indicates understanding of these issues and agrees to the plan. Return for Once orthotics are ready for dispense.     Juan J Burnette DPM  12/29/2020

## 2020-12-31 ENCOUNTER — HOSPITAL ENCOUNTER (OUTPATIENT)
Dept: CT IMAGING | Age: 60
Discharge: HOME | End: 2020-12-31
Attending: INTERNAL MEDICINE
Payer: COMMERCIAL

## 2020-12-31 DIAGNOSIS — F17.210 SMOKING GREATER THAN 40 PACK YEARS: ICD-10-CM

## 2020-12-31 RX ORDER — TADALAFIL 5 MG/1
5 TABLET ORAL DAILY
Qty: 30 TABLET | Refills: 5 | Status: SHIPPED | OUTPATIENT
Start: 2020-12-31 | End: 2021-11-29

## 2020-12-31 NOTE — PROGRESS NOTES
Per phone consent, left voicemail with results. Instructed to call back with any questions/concerns.

## 2021-01-12 NOTE — H&P
HPI:     Susan Valdovinos is a 64year old male with a PMH of EDSON (on CPAP), fam h/o CaP, ED, BPH/LUTS (on 5 mg daily cialis x 1 mo). He presents as a consult from Dr Melvina Chowdhury' office with BPH/LUTS, OAB/UI, ED, and fam h/o CaP. Also with AMH on UA today. reviewed OAB tx and prevention strategies at today's visit and I provided educational materials for this. I encouraged the patient to drink at least 40-60 oz water per day or enough to keep urine clear.  I also reviewed dietary irritants and provided educat tobacco: Never Used      Tobacco comment: will try gum    Alcohol use:  Yes      Alcohol/week: 21.0 standard drinks      Types: 21 Cans of beer per week      Comment: 3-4 Beers/day    Drug use: Not Currently      Types: Cannabis       Medications (Active pr obstruction/lower urinary tract symptoms  -     URINALYSIS, AUTO, W/O SCOPE    Erectile dysfunction, unspecified erectile dysfunction type    Family history of prostate cancer    Asymptomatic microscopic hematuria  -     CT UROGRAM(W+WO)ROCIO(CPT=74178);  Shreyas

## 2021-01-18 ENCOUNTER — OFFICE VISIT (OUTPATIENT)
Dept: SURGERY | Facility: CLINIC | Age: 61
End: 2021-01-18
Payer: COMMERCIAL

## 2021-01-18 VITALS — DIASTOLIC BLOOD PRESSURE: 107 MMHG | HEART RATE: 73 BPM | TEMPERATURE: 97 F | SYSTOLIC BLOOD PRESSURE: 162 MMHG

## 2021-01-18 DIAGNOSIS — N52.9 ERECTILE DYSFUNCTION, UNSPECIFIED ERECTILE DYSFUNCTION TYPE: ICD-10-CM

## 2021-01-18 DIAGNOSIS — Z80.42 FAMILY HISTORY OF PROSTATE CANCER: ICD-10-CM

## 2021-01-18 DIAGNOSIS — N13.8 BPH WITH OBSTRUCTION/LOWER URINARY TRACT SYMPTOMS: Primary | ICD-10-CM

## 2021-01-18 DIAGNOSIS — N40.1 BPH WITH OBSTRUCTION/LOWER URINARY TRACT SYMPTOMS: Primary | ICD-10-CM

## 2021-01-18 DIAGNOSIS — R31.21 ASYMPTOMATIC MICROSCOPIC HEMATURIA: ICD-10-CM

## 2021-01-18 LAB
APPEARANCE: CLEAR
MULTISTIX LOT#: 5077 NUMERIC
PH, URINE: 5.5 (ref 4.5–8)
SPECIFIC GRAVITY: 1.02 (ref 1–1.03)
URINE-COLOR: YELLOW
UROBILINOGEN,SEMI-QN: 0.2 MG/DL (ref 0–1.9)

## 2021-01-18 PROCEDURE — 81003 URINALYSIS AUTO W/O SCOPE: CPT | Performed by: UROLOGY

## 2021-01-18 PROCEDURE — 3080F DIAST BP >= 90 MM HG: CPT | Performed by: UROLOGY

## 2021-01-18 PROCEDURE — 51798 US URINE CAPACITY MEASURE: CPT | Performed by: UROLOGY

## 2021-01-18 PROCEDURE — 99244 OFF/OP CNSLTJ NEW/EST MOD 40: CPT | Performed by: UROLOGY

## 2021-01-18 PROCEDURE — 3077F SYST BP >= 140 MM HG: CPT | Performed by: UROLOGY

## 2021-01-29 ENCOUNTER — TELEPHONE (OUTPATIENT)
Dept: PODIATRY CLINIC | Facility: CLINIC | Age: 61
End: 2021-01-29

## 2021-01-29 NOTE — TELEPHONE ENCOUNTER
Left message to patient to call back. Patient needs to make an appoinment or stop by the office to  orthotics.

## 2021-02-01 ENCOUNTER — TELEPHONE (OUTPATIENT)
Dept: SURGERY | Facility: CLINIC | Age: 61
End: 2021-02-01

## 2021-02-02 NOTE — TELEPHONE ENCOUNTER
Called patient to remind him of his appt on 2/8 Monday at 80 for cystoscopy with CT prior. Patient requesting to postpone it at this time due to personal matters. All questions answered. Verbalized understanding.

## 2021-02-02 NOTE — TELEPHONE ENCOUNTER
Hi,  Could you please call this patient and remind them to get the following tests prior to their upcoming appointment.    - CT urogram    If they can't get this done that's fine. Will still see them as scheduled.     Thanks,  MPH

## 2021-03-08 ENCOUNTER — HOSPITAL ENCOUNTER (OUTPATIENT)
Dept: CT IMAGING | Age: 61
Discharge: HOME | End: 2021-03-08
Attending: UROLOGY
Payer: COMMERCIAL

## 2021-03-08 DIAGNOSIS — R31.21 ASYMPTOMATIC MICROSCOPIC HEMATURIA: ICD-10-CM

## 2021-03-08 LAB — CREAT BLD-MCNC: 1 MG/DL

## 2021-03-08 PROCEDURE — 74178 CT ABD&PLV WO CNTR FLWD CNTR: CPT | Performed by: UROLOGY

## 2021-03-08 PROCEDURE — 76377 3D RENDER W/INTRP POSTPROCES: CPT | Performed by: UROLOGY

## 2021-03-08 PROCEDURE — 82565 ASSAY OF CREATININE: CPT

## 2021-03-08 NOTE — PROGRESS NOTES
Results reviewed. CT shows no significant abnormalities other than enlarged prostate. I would still recommend office cystoscopy for microscopic hematuria when patient is able to schedule this. Please let patient know. Thank you.

## 2021-11-29 ENCOUNTER — OFFICE VISIT (OUTPATIENT)
Dept: FAMILY MEDICINE CLINIC | Facility: CLINIC | Age: 61
End: 2021-11-29
Payer: COMMERCIAL

## 2021-11-29 VITALS
HEART RATE: 71 BPM | DIASTOLIC BLOOD PRESSURE: 70 MMHG | RESPIRATION RATE: 16 BRPM | BODY MASS INDEX: 22.75 KG/M2 | SYSTOLIC BLOOD PRESSURE: 130 MMHG | TEMPERATURE: 98 F | WEIGHT: 183 LBS | OXYGEN SATURATION: 100 % | HEIGHT: 75 IN

## 2021-11-29 DIAGNOSIS — Z00.00 ROUTINE MEDICAL EXAM: Primary | ICD-10-CM

## 2021-11-29 DIAGNOSIS — N40.1 BENIGN PROSTATIC HYPERPLASIA WITH URINARY HESITANCY: ICD-10-CM

## 2021-11-29 DIAGNOSIS — R39.11 BENIGN PROSTATIC HYPERPLASIA WITH URINARY HESITANCY: ICD-10-CM

## 2021-11-29 DIAGNOSIS — Z12.5 PROSTATE CANCER SCREENING: ICD-10-CM

## 2021-11-29 DIAGNOSIS — Z28.21 INFLUENZA VACCINATION DECLINED BY PATIENT: ICD-10-CM

## 2021-11-29 DIAGNOSIS — R31.21 ASYMPTOMATIC MICROSCOPIC HEMATURIA: ICD-10-CM

## 2021-11-29 DIAGNOSIS — Z12.11 COLON CANCER SCREENING: ICD-10-CM

## 2021-11-29 DIAGNOSIS — R39.11 URINARY HESITANCY: ICD-10-CM

## 2021-11-29 DIAGNOSIS — Z00.00 LABORATORY EXAM ORDERED AS PART OF ROUTINE GENERAL MEDICAL EXAMINATION: ICD-10-CM

## 2021-11-29 PROCEDURE — 3008F BODY MASS INDEX DOCD: CPT | Performed by: FAMILY MEDICINE

## 2021-11-29 PROCEDURE — 3075F SYST BP GE 130 - 139MM HG: CPT | Performed by: FAMILY MEDICINE

## 2021-11-29 PROCEDURE — 99396 PREV VISIT EST AGE 40-64: CPT | Performed by: FAMILY MEDICINE

## 2021-11-29 PROCEDURE — 3078F DIAST BP <80 MM HG: CPT | Performed by: FAMILY MEDICINE

## 2021-11-29 RX ORDER — TADALAFIL 5 MG/1
5 TABLET ORAL DAILY
Qty: 30 TABLET | Refills: 11 | Status: SHIPPED | OUTPATIENT
Start: 2021-11-29

## 2021-11-29 NOTE — PROGRESS NOTES
Patient presents with:  Physical  Immunization/Injection: declines the Influenza vaccine today      HPI:  Isma Lizama is a 63year old male here today for preventative visit. Imms- up to date with both covid & tdap.  Declines pneum shot, says he'l HEMORRHOIDECTOMY,INT/EXT,SIMPLE     • KNEE ARTHROSCOPY Right     torn meniscus   • WISDOM TEETH REMOVED  2011    3 teeth     Travoprost, LUISANA Free, (TRAVATAN Z) 0.004 % Ophthalmic Solution, Apply 1 drop to eye nightly., Disp: 1 Bottle, Rfl: 6  Vitamin C 500 Care: Not Asked        Exercise: Not Asked        Bike Helmet: Not Asked        Seat Belt: Not Asked        Self-Exams: Not Asked    Social History Narrative      Not on file    Social Determinants of Health  Financial Resource Strain: Not on file  Food In ASSESSMENT/PLAN:    1. Routine medical exam    2. Prostate cancer screening  - PSA SCREEN; Future    3. Colon cancer screening  -up to date.     4. Laboratory exam ordered as part of routine general medical examination  - CBC WITH DIFFERENTIAL WITH PLAT

## 2021-12-03 ENCOUNTER — LAB ENCOUNTER (OUTPATIENT)
Dept: LAB | Age: 61
End: 2021-12-03
Attending: FAMILY MEDICINE
Payer: COMMERCIAL

## 2021-12-03 DIAGNOSIS — Z12.5 PROSTATE CANCER SCREENING: ICD-10-CM

## 2021-12-03 DIAGNOSIS — D64.9 NORMOCYTIC ANEMIA: ICD-10-CM

## 2021-12-03 DIAGNOSIS — Z00.00 LABORATORY EXAM ORDERED AS PART OF ROUTINE GENERAL MEDICAL EXAMINATION: ICD-10-CM

## 2021-12-03 DIAGNOSIS — R31.21 ASYMPTOMATIC MICROSCOPIC HEMATURIA: ICD-10-CM

## 2021-12-03 PROCEDURE — 82746 ASSAY OF FOLIC ACID SERUM: CPT | Performed by: FAMILY MEDICINE

## 2021-12-03 PROCEDURE — 80061 LIPID PANEL: CPT | Performed by: FAMILY MEDICINE

## 2021-12-03 PROCEDURE — 85025 COMPLETE CBC W/AUTO DIFF WBC: CPT | Performed by: FAMILY MEDICINE

## 2021-12-03 PROCEDURE — 84153 ASSAY OF PSA TOTAL: CPT | Performed by: FAMILY MEDICINE

## 2021-12-03 PROCEDURE — 81001 URINALYSIS AUTO W/SCOPE: CPT | Performed by: FAMILY MEDICINE

## 2021-12-03 PROCEDURE — 80053 COMPREHEN METABOLIC PANEL: CPT | Performed by: FAMILY MEDICINE

## 2022-02-03 ENCOUNTER — TELEPHONE (OUTPATIENT)
Dept: FAMILY MEDICINE CLINIC | Facility: CLINIC | Age: 62
End: 2022-02-03

## 2022-02-03 NOTE — TELEPHONE ENCOUNTER
----- Message from Jaylyn Robert MD sent at 12/9/2021  9:57 PM CST -----  First few tests show mild anemia, please make an appt to discuss  Urine test is stable,  Rest of labs normal

## 2022-02-24 ENCOUNTER — TELEMEDICINE (OUTPATIENT)
Dept: FAMILY MEDICINE CLINIC | Facility: CLINIC | Age: 62
End: 2022-02-24

## 2022-02-24 DIAGNOSIS — D64.9 NORMOCYTIC ANEMIA: ICD-10-CM

## 2022-02-24 DIAGNOSIS — R31.21 ASYMPTOMATIC MICROSCOPIC HEMATURIA: Primary | ICD-10-CM

## 2022-02-24 PROCEDURE — 99214 OFFICE O/P EST MOD 30 MIN: CPT | Performed by: FAMILY MEDICINE

## 2022-03-07 ENCOUNTER — TELEPHONE (OUTPATIENT)
Dept: SURGERY | Facility: CLINIC | Age: 62
End: 2022-03-07

## 2022-03-18 ENCOUNTER — TELEPHONE (OUTPATIENT)
Dept: SURGERY | Facility: CLINIC | Age: 62
End: 2022-03-18

## 2022-03-18 NOTE — TELEPHONE ENCOUNTER
[employer]. Spoke with Malik.  PA started for Cysto   Ref # [id number]  Clinical faxed to [phone] 03/18/2022

## 2022-04-06 ENCOUNTER — PROCEDURE (OUTPATIENT)
Dept: SURGERY | Facility: CLINIC | Age: 62
End: 2022-04-06
Payer: COMMERCIAL

## 2022-04-06 VITALS — DIASTOLIC BLOOD PRESSURE: 78 MMHG | HEART RATE: 74 BPM | SYSTOLIC BLOOD PRESSURE: 130 MMHG

## 2022-04-06 DIAGNOSIS — N52.9 ERECTILE DYSFUNCTION, UNSPECIFIED ERECTILE DYSFUNCTION TYPE: ICD-10-CM

## 2022-04-06 DIAGNOSIS — N40.1 BPH WITH OBSTRUCTION/LOWER URINARY TRACT SYMPTOMS: Primary | ICD-10-CM

## 2022-04-06 DIAGNOSIS — R39.15 URINARY URGENCY: ICD-10-CM

## 2022-04-06 DIAGNOSIS — Z80.42 FAMILY HISTORY OF PROSTATE CANCER: ICD-10-CM

## 2022-04-06 DIAGNOSIS — N13.8 BPH WITH OBSTRUCTION/LOWER URINARY TRACT SYMPTOMS: Primary | ICD-10-CM

## 2022-04-06 DIAGNOSIS — R31.21 ASYMPTOMATIC MICROSCOPIC HEMATURIA: ICD-10-CM

## 2022-04-06 LAB
APPEARANCE: CLEAR
BILIRUBIN: NEGATIVE
GLUCOSE (URINE DIPSTICK): NEGATIVE MG/DL
KETONES (URINE DIPSTICK): NEGATIVE MG/DL
LEUKOCYTES: NEGATIVE
MULTISTIX LOT#: ABNORMAL NUMERIC
NITRITE, URINE: NEGATIVE
PH, URINE: 5.5 (ref 4.5–8)
PROTEIN (URINE DIPSTICK): NEGATIVE MG/DL
SPECIFIC GRAVITY: 1.01 (ref 1–1.03)
URINE-COLOR: YELLOW
UROBILINOGEN,SEMI-QN: 0.2 MG/DL (ref 0–1.9)

## 2022-04-06 PROCEDURE — 3075F SYST BP GE 130 - 139MM HG: CPT | Performed by: UROLOGY

## 2022-04-06 PROCEDURE — 52000 CYSTOURETHROSCOPY: CPT | Performed by: UROLOGY

## 2022-04-06 PROCEDURE — 3078F DIAST BP <80 MM HG: CPT | Performed by: UROLOGY

## 2022-04-06 PROCEDURE — 81003 URINALYSIS AUTO W/O SCOPE: CPT | Performed by: UROLOGY

## 2022-04-06 PROCEDURE — 99214 OFFICE O/P EST MOD 30 MIN: CPT | Performed by: UROLOGY

## 2022-04-06 RX ORDER — CIPROFLOXACIN 500 MG/1
500 TABLET, FILM COATED ORAL ONCE
Status: COMPLETED | OUTPATIENT
Start: 2022-04-06 | End: 2022-04-06

## 2022-04-06 RX ADMIN — CIPROFLOXACIN 500 MG: 500 TABLET, FILM COATED ORAL at 09:15:00

## 2023-03-07 DIAGNOSIS — R39.11 BENIGN PROSTATIC HYPERPLASIA WITH URINARY HESITANCY: ICD-10-CM

## 2023-03-07 DIAGNOSIS — N40.1 BENIGN PROSTATIC HYPERPLASIA WITH URINARY HESITANCY: ICD-10-CM

## 2023-03-07 DIAGNOSIS — R39.11 URINARY HESITANCY: ICD-10-CM

## 2023-03-09 RX ORDER — TADALAFIL 5 MG/1
TABLET ORAL
Qty: 30 TABLET | Refills: 11 | Status: SHIPPED | OUTPATIENT
Start: 2023-03-09

## 2023-04-17 ENCOUNTER — OFFICE VISIT (OUTPATIENT)
Dept: FAMILY MEDICINE CLINIC | Facility: CLINIC | Age: 63
End: 2023-04-17
Payer: COMMERCIAL

## 2023-04-17 VITALS
TEMPERATURE: 98 F | DIASTOLIC BLOOD PRESSURE: 82 MMHG | WEIGHT: 187.63 LBS | RESPIRATION RATE: 16 BRPM | HEIGHT: 75 IN | BODY MASS INDEX: 23.33 KG/M2 | SYSTOLIC BLOOD PRESSURE: 126 MMHG | OXYGEN SATURATION: 98 % | HEART RATE: 58 BPM

## 2023-04-17 DIAGNOSIS — Z12.11 COLON CANCER SCREENING: ICD-10-CM

## 2023-04-17 DIAGNOSIS — Z00.00 ROUTINE MEDICAL EXAM: Primary | ICD-10-CM

## 2023-04-17 DIAGNOSIS — Z00.00 LABORATORY EXAM ORDERED AS PART OF ROUTINE GENERAL MEDICAL EXAMINATION: ICD-10-CM

## 2023-04-17 DIAGNOSIS — F17.210 CIGARETTE SMOKER MOTIVATED TO QUIT: ICD-10-CM

## 2023-04-17 DIAGNOSIS — Z12.5 PROSTATE CANCER SCREENING: ICD-10-CM

## 2023-04-17 DIAGNOSIS — Z86.010 HISTORY OF COLON POLYPS: ICD-10-CM

## 2023-04-17 PROBLEM — N40.1 BENIGN PROSTATIC HYPERPLASIA WITH URINARY HESITANCY: Status: ACTIVE | Noted: 2018-08-31

## 2023-04-17 PROBLEM — R39.11 BENIGN PROSTATIC HYPERPLASIA WITH URINARY HESITANCY: Status: ACTIVE | Noted: 2018-08-31

## 2023-04-17 PROBLEM — R39.198 SUBJECTIVE CHANGE IN URINATION: Status: RESOLVED | Noted: 2018-08-31 | Resolved: 2023-04-17

## 2023-04-17 PROCEDURE — 99396 PREV VISIT EST AGE 40-64: CPT | Performed by: FAMILY MEDICINE

## 2023-04-17 PROCEDURE — 3008F BODY MASS INDEX DOCD: CPT | Performed by: FAMILY MEDICINE

## 2023-04-17 PROCEDURE — 3079F DIAST BP 80-89 MM HG: CPT | Performed by: FAMILY MEDICINE

## 2023-04-17 PROCEDURE — 3074F SYST BP LT 130 MM HG: CPT | Performed by: FAMILY MEDICINE

## 2023-04-17 RX ORDER — POLYETHYLENE GLYCOL 3350 17 G
2 POWDER IN PACKET (EA) ORAL AS NEEDED
Qty: 20 EACH | Refills: 0 | Status: SHIPPED | OUTPATIENT
Start: 2023-04-17

## 2023-04-24 ENCOUNTER — LAB ENCOUNTER (OUTPATIENT)
Dept: LAB | Age: 63
End: 2023-04-24
Attending: FAMILY MEDICINE
Payer: COMMERCIAL

## 2023-04-24 DIAGNOSIS — Z12.5 PROSTATE CANCER SCREENING: ICD-10-CM

## 2023-04-24 DIAGNOSIS — Z00.00 LABORATORY EXAM ORDERED AS PART OF ROUTINE GENERAL MEDICAL EXAMINATION: ICD-10-CM

## 2023-04-24 LAB
ALBUMIN SERPL-MCNC: 3.7 G/DL (ref 3.4–5)
ALBUMIN/GLOB SERPL: 1 {RATIO} (ref 1–2)
ALP LIVER SERPL-CCNC: 45 U/L
ALT SERPL-CCNC: 24 U/L
ANION GAP SERPL CALC-SCNC: 7 MMOL/L (ref 0–18)
AST SERPL-CCNC: 30 U/L (ref 15–37)
BASOPHILS # BLD AUTO: 0.03 X10(3) UL (ref 0–0.2)
BASOPHILS NFR BLD AUTO: 0.6 %
BILIRUB SERPL-MCNC: 0.4 MG/DL (ref 0.1–2)
BUN BLD-MCNC: 12 MG/DL (ref 7–18)
CALCIUM BLD-MCNC: 8.9 MG/DL (ref 8.5–10.1)
CHLORIDE SERPL-SCNC: 110 MMOL/L (ref 98–112)
CHOLEST SERPL-MCNC: 126 MG/DL (ref ?–200)
CO2 SERPL-SCNC: 23 MMOL/L (ref 21–32)
COMPLEXED PSA SERPL-MCNC: 0.98 NG/ML (ref ?–4)
CREAT BLD-MCNC: 1.03 MG/DL
EOSINOPHIL # BLD AUTO: 0.13 X10(3) UL (ref 0–0.7)
EOSINOPHIL NFR BLD AUTO: 2.4 %
ERYTHROCYTE [DISTWIDTH] IN BLOOD BY AUTOMATED COUNT: 14.3 %
FASTING PATIENT LIPID ANSWER: YES
FASTING STATUS PATIENT QL REPORTED: YES
GFR SERPLBLD BASED ON 1.73 SQ M-ARVRAT: 83 ML/MIN/1.73M2 (ref 60–?)
GLOBULIN PLAS-MCNC: 3.7 G/DL (ref 2.8–4.4)
GLUCOSE BLD-MCNC: 85 MG/DL (ref 70–99)
HCT VFR BLD AUTO: 39.4 %
HDLC SERPL-MCNC: 71 MG/DL (ref 40–59)
HGB BLD-MCNC: 12.5 G/DL
IMM GRANULOCYTES # BLD AUTO: 0.01 X10(3) UL (ref 0–1)
IMM GRANULOCYTES NFR BLD: 0.2 %
LDLC SERPL CALC-MCNC: 44 MG/DL (ref ?–100)
LYMPHOCYTES # BLD AUTO: 2.34 X10(3) UL (ref 1–4)
LYMPHOCYTES NFR BLD AUTO: 43.1 %
MCH RBC QN AUTO: 28.3 PG (ref 26–34)
MCHC RBC AUTO-ENTMCNC: 31.7 G/DL (ref 31–37)
MCV RBC AUTO: 89.1 FL
MONOCYTES # BLD AUTO: 0.51 X10(3) UL (ref 0.1–1)
MONOCYTES NFR BLD AUTO: 9.4 %
NEUTROPHILS # BLD AUTO: 2.41 X10 (3) UL (ref 1.5–7.7)
NEUTROPHILS # BLD AUTO: 2.41 X10(3) UL (ref 1.5–7.7)
NEUTROPHILS NFR BLD AUTO: 44.3 %
NONHDLC SERPL-MCNC: 55 MG/DL (ref ?–130)
OSMOLALITY SERPL CALC.SUM OF ELEC: 289 MOSM/KG (ref 275–295)
PLATELET # BLD AUTO: 240 10(3)UL (ref 150–450)
POTASSIUM SERPL-SCNC: 4.2 MMOL/L (ref 3.5–5.1)
PROT SERPL-MCNC: 7.4 G/DL (ref 6.4–8.2)
RBC # BLD AUTO: 4.42 X10(6)UL
SODIUM SERPL-SCNC: 140 MMOL/L (ref 136–145)
TRIGL SERPL-MCNC: 45 MG/DL (ref 30–149)
VLDLC SERPL CALC-MCNC: 6 MG/DL (ref 0–30)
WBC # BLD AUTO: 5.4 X10(3) UL (ref 4–11)

## 2023-04-24 PROCEDURE — 84153 ASSAY OF PSA TOTAL: CPT | Performed by: FAMILY MEDICINE

## 2023-04-24 PROCEDURE — 80061 LIPID PANEL: CPT | Performed by: FAMILY MEDICINE

## 2023-04-24 PROCEDURE — 80053 COMPREHEN METABOLIC PANEL: CPT | Performed by: FAMILY MEDICINE

## 2023-04-24 PROCEDURE — 85025 COMPLETE CBC W/AUTO DIFF WBC: CPT | Performed by: FAMILY MEDICINE

## 2023-05-24 DIAGNOSIS — D64.9 ANEMIA, UNSPECIFIED TYPE: Primary | ICD-10-CM

## 2023-12-15 ENCOUNTER — HOSPITAL ENCOUNTER (OUTPATIENT)
Dept: CT IMAGING | Age: 63
Discharge: HOME | End: 2023-12-15
Payer: COMMERCIAL

## 2023-12-15 DIAGNOSIS — Z87.891 PERSONAL HISTORY OF NICOTINE DEPENDENCE: ICD-10-CM

## 2023-12-15 PROCEDURE — 71271 CT THORAX LUNG CANCER SCR C-: CPT

## 2024-04-30 DIAGNOSIS — R39.11 BENIGN PROSTATIC HYPERPLASIA WITH URINARY HESITANCY: ICD-10-CM

## 2024-04-30 DIAGNOSIS — R39.11 URINARY HESITANCY: ICD-10-CM

## 2024-04-30 DIAGNOSIS — N40.1 BENIGN PROSTATIC HYPERPLASIA WITH URINARY HESITANCY: ICD-10-CM

## 2024-04-30 RX ORDER — TADALAFIL 5 MG/1
TABLET ORAL
Qty: 30 TABLET | Refills: 11 | OUTPATIENT
Start: 2024-04-30

## 2024-08-15 ENCOUNTER — LAB ENCOUNTER (OUTPATIENT)
Dept: LAB | Age: 64
End: 2024-08-15
Attending: FAMILY MEDICINE
Payer: COMMERCIAL

## 2024-08-15 ENCOUNTER — OFFICE VISIT (OUTPATIENT)
Dept: FAMILY MEDICINE CLINIC | Facility: CLINIC | Age: 64
End: 2024-08-15
Payer: COMMERCIAL

## 2024-08-15 VITALS
DIASTOLIC BLOOD PRESSURE: 70 MMHG | HEART RATE: 60 BPM | TEMPERATURE: 98 F | RESPIRATION RATE: 16 BRPM | BODY MASS INDEX: 22.26 KG/M2 | HEIGHT: 75 IN | WEIGHT: 179 LBS | OXYGEN SATURATION: 98 % | SYSTOLIC BLOOD PRESSURE: 120 MMHG

## 2024-08-15 DIAGNOSIS — D64.9 NORMOCYTIC ANEMIA: ICD-10-CM

## 2024-08-15 DIAGNOSIS — K62.5 RECTAL BLEEDING: ICD-10-CM

## 2024-08-15 DIAGNOSIS — J43.9 PULMONARY EMPHYSEMA, UNSPECIFIED EMPHYSEMA TYPE (HCC): ICD-10-CM

## 2024-08-15 DIAGNOSIS — Z12.5 PROSTATE CANCER SCREENING: ICD-10-CM

## 2024-08-15 DIAGNOSIS — Z00.00 LABORATORY EXAM ORDERED AS PART OF ROUTINE GENERAL MEDICAL EXAMINATION: ICD-10-CM

## 2024-08-15 DIAGNOSIS — R39.11 URINARY HESITANCY: ICD-10-CM

## 2024-08-15 DIAGNOSIS — Z00.00 ROUTINE MEDICAL EXAM: Primary | ICD-10-CM

## 2024-08-15 DIAGNOSIS — R39.11 BENIGN PROSTATIC HYPERPLASIA WITH URINARY HESITANCY: ICD-10-CM

## 2024-08-15 DIAGNOSIS — N40.1 BENIGN PROSTATIC HYPERPLASIA WITH URINARY HESITANCY: ICD-10-CM

## 2024-08-15 DIAGNOSIS — F17.210 CIGARETTE SMOKER: ICD-10-CM

## 2024-08-15 DIAGNOSIS — R31.21 ASYMPTOMATIC MICROSCOPIC HEMATURIA: ICD-10-CM

## 2024-08-15 LAB
ALBUMIN SERPL-MCNC: 4.7 G/DL (ref 3.2–4.8)
ALBUMIN/GLOB SERPL: 1.7 {RATIO} (ref 1–2)
ALP LIVER SERPL-CCNC: 46 U/L
ALT SERPL-CCNC: 13 U/L
ANION GAP SERPL CALC-SCNC: 2 MMOL/L (ref 0–18)
AST SERPL-CCNC: 26 U/L (ref ?–34)
BASOPHILS # BLD AUTO: 0.05 X10(3) UL (ref 0–0.2)
BASOPHILS NFR BLD AUTO: 0.9 %
BILIRUB SERPL-MCNC: 0.5 MG/DL (ref 0.2–1.1)
BILIRUB UR QL STRIP.AUTO: NEGATIVE
BUN BLD-MCNC: 10 MG/DL (ref 9–23)
CALCIUM BLD-MCNC: 10 MG/DL (ref 8.7–10.4)
CHLORIDE SERPL-SCNC: 108 MMOL/L (ref 98–112)
CHOLEST SERPL-MCNC: 143 MG/DL (ref ?–200)
CLARITY UR REFRACT.AUTO: CLEAR
CO2 SERPL-SCNC: 28 MMOL/L (ref 21–32)
COMPLEXED PSA SERPL-MCNC: 0.69 NG/ML (ref ?–4)
CREAT BLD-MCNC: 1.06 MG/DL
EGFRCR SERPLBLD CKD-EPI 2021: 80 ML/MIN/1.73M2 (ref 60–?)
EOSINOPHIL # BLD AUTO: 0.11 X10(3) UL (ref 0–0.7)
EOSINOPHIL NFR BLD AUTO: 2.1 %
ERYTHROCYTE [DISTWIDTH] IN BLOOD BY AUTOMATED COUNT: 14.6 %
FASTING PATIENT LIPID ANSWER: YES
FASTING STATUS PATIENT QL REPORTED: YES
GLOBULIN PLAS-MCNC: 2.8 G/DL (ref 2–3.5)
GLUCOSE BLD-MCNC: 93 MG/DL (ref 70–99)
GLUCOSE UR STRIP.AUTO-MCNC: NORMAL MG/DL
HCT VFR BLD AUTO: 38.1 %
HDLC SERPL-MCNC: 63 MG/DL (ref 40–59)
HGB BLD-MCNC: 12.5 G/DL
IMM GRANULOCYTES # BLD AUTO: 0.01 X10(3) UL (ref 0–1)
IMM GRANULOCYTES NFR BLD: 0.2 %
KETONES UR STRIP.AUTO-MCNC: NEGATIVE MG/DL
LDLC SERPL CALC-MCNC: 69 MG/DL (ref ?–100)
LEUKOCYTE ESTERASE UR QL STRIP.AUTO: NEGATIVE
LYMPHOCYTES # BLD AUTO: 2.21 X10(3) UL (ref 1–4)
LYMPHOCYTES NFR BLD AUTO: 41.5 %
MCH RBC QN AUTO: 28.2 PG (ref 26–34)
MCHC RBC AUTO-ENTMCNC: 32.8 G/DL (ref 31–37)
MCV RBC AUTO: 86 FL
MONOCYTES # BLD AUTO: 0.53 X10(3) UL (ref 0.1–1)
MONOCYTES NFR BLD AUTO: 10 %
NEUTROPHILS # BLD AUTO: 2.41 X10 (3) UL (ref 1.5–7.7)
NEUTROPHILS # BLD AUTO: 2.41 X10(3) UL (ref 1.5–7.7)
NEUTROPHILS NFR BLD AUTO: 45.3 %
NITRITE UR QL STRIP.AUTO: NEGATIVE
NONHDLC SERPL-MCNC: 80 MG/DL (ref ?–130)
OSMOLALITY SERPL CALC.SUM OF ELEC: 285 MOSM/KG (ref 275–295)
PH UR STRIP.AUTO: 6 [PH] (ref 5–8)
PLATELET # BLD AUTO: 248 10(3)UL (ref 150–450)
POTASSIUM SERPL-SCNC: 4.1 MMOL/L (ref 3.5–5.1)
PROT SERPL-MCNC: 7.5 G/DL (ref 5.7–8.2)
PROT UR STRIP.AUTO-MCNC: NEGATIVE MG/DL
RBC # BLD AUTO: 4.43 X10(6)UL
SODIUM SERPL-SCNC: 138 MMOL/L (ref 136–145)
SP GR UR STRIP.AUTO: 1.01 (ref 1–1.03)
TRIGL SERPL-MCNC: 51 MG/DL (ref 30–149)
UROBILINOGEN UR STRIP.AUTO-MCNC: NORMAL MG/DL
VLDLC SERPL CALC-MCNC: 8 MG/DL (ref 0–30)
WBC # BLD AUTO: 5.3 X10(3) UL (ref 4–11)

## 2024-08-15 PROCEDURE — 99396 PREV VISIT EST AGE 40-64: CPT | Performed by: FAMILY MEDICINE

## 2024-08-15 PROCEDURE — 80053 COMPREHEN METABOLIC PANEL: CPT | Performed by: FAMILY MEDICINE

## 2024-08-15 PROCEDURE — 80061 LIPID PANEL: CPT | Performed by: FAMILY MEDICINE

## 2024-08-15 PROCEDURE — 3008F BODY MASS INDEX DOCD: CPT | Performed by: FAMILY MEDICINE

## 2024-08-15 PROCEDURE — 99213 OFFICE O/P EST LOW 20 MIN: CPT | Performed by: FAMILY MEDICINE

## 2024-08-15 PROCEDURE — 81001 URINALYSIS AUTO W/SCOPE: CPT | Performed by: FAMILY MEDICINE

## 2024-08-15 PROCEDURE — 3078F DIAST BP <80 MM HG: CPT | Performed by: FAMILY MEDICINE

## 2024-08-15 PROCEDURE — 85025 COMPLETE CBC W/AUTO DIFF WBC: CPT | Performed by: FAMILY MEDICINE

## 2024-08-15 PROCEDURE — 3074F SYST BP LT 130 MM HG: CPT | Performed by: FAMILY MEDICINE

## 2024-08-15 PROCEDURE — 84153 ASSAY OF PSA TOTAL: CPT | Performed by: FAMILY MEDICINE

## 2024-08-15 RX ORDER — TADALAFIL 5 MG/1
5 TABLET ORAL DAILY
Qty: 30 TABLET | Refills: 11 | Status: SHIPPED | OUTPATIENT
Start: 2024-08-15

## 2024-08-15 NOTE — PROGRESS NOTES
Chief Complaint   Patient presents with    Physical       HPI:  Issa Molina is a 63 year old male here today for preventative visit.      Imms- up to date with tdap. Declines pneum shot, says he'll work on stopping smoking first. Discussed Shingrix, RSV, flu and covid.        Prostate cancer screening- + hesitency, \"pees in [place] code\" (starts and stops). Has 2 paternal uncles with prostate cancer, none at an early age. 1 has passed from it. PSA 0.98 on 4/24/23.     BPH/ED- doing well on cialis 5mg for this. Saw Dr. Pressley, urology, and also had a hematuria work-up. CT urogram 3/8/21 was normal besides mild-mod enlargement of prostate. Cystoscopy finally done 4/6/22.  Dr. Pressley's not 4/6/22: He declines meds for BPH, fluid adjustments discussed for OAB, he will continue 5 mg cialis prn and continue annual PSA checks. F/u with me if urinary symptoms worsen or if questions concerns. Should repeat hematuria eval in 5 y given smoking history should AMH persist.            Colon cancer screening- c-scope 11/6/23 repeat 5yrs with extended prep, Dr. Peguero, [facility].    + rectal bleeding started 10 d ago and 2 mo ago and not associated with diarrhea or constipation. + drops in the toilet bowl    Denies- melena, n/v, fever, chills, abd pain        Hepatitis C screening- non-reactive on 9/29/17.        Diet/exercise- working on this, normal BMI         Smoking- Down to half ppd cigarettes, but smokes cannabis daily as well. Didn't give a quantity. Says it's just out of habit, not for a specific purpose.  Low dose lung CT scan last 12/15/23 prior was 12/31/20. Is seeing Dr. Epps from [facility] and would like to see him again, not on referral list, but has seen frank so referred to him.     Down to 5-12 cigs/day. Was 14-18 cigs/day in 2022 and marking it on a calendar to reduce.  He is interested in quitting. Was going to do the gum, but it sticks to his dentures, so will try the lozenges. Would rather quit that get  pneumonia shot.           Glaucoma- sees Dr. Dueñas in [place] but is still convenient for him due to the hours.            Past Medical History:    Asymptomatic microscopic hematuria    normal CT urogram 3/8/21, cystocopy 4/6/22, repeat eval 5 yrs 2/2 smoking per Dr. Pressley    Benign prostatic hyperplasia with urinary hesitancy    ED (erectile dysfunction)    Family history of prostate cancer    2 paternal uncles    Glaucoma    Normocytic anemia    Dr. Landin, iron defic anemia    EDSON on CPAP    AHI 6 RDI 14 Supine AHI 29 non-supine AHI 3 Sao2 Kavon 89%     Wears glasses     Past Surgical History:   Procedure Laterality Date    Colonoscopy  02/16/2015    repeat 5 yrs (tubular adenoma), Dr. Peguero, [facility]    Colonoscopy  10/09/2020    repeat 3 yrs with extended prep, Dr. Peguero, [facility]    Colonoscopy  11/06/2023    repeat 5yrs with extended prep, Dr. Peguero, [facility]    [facility]  [facility]    normal, repeat in 5 yrs, due to smoking, Dr. Pressley    Hemorrhoidectomy,int/ext,simple      Knee arthroscopy Right     torn meniscus    [place] teeth removed  2011    3 teeth     Current Outpatient Medications on File Prior to Visit   Medication Sig Dispense Refill    travoprost, BAK free, (TRAVATAN Z) 0.004 % Ophthalmic Solution Apply 1 drop to eye nightly. 1 each 5    Vitamin C 500 MG Oral Tab Take 1 tablet (500 mg total) by mouth daily.      Vitamin B-12 250 MCG Oral Tab Take 1 tablet (250 mcg total) by mouth daily.      vitamin E 200 UNITS Oral Cap Take 1 capsule (200 Units total) by mouth daily.      nicotine polacrilex (NICORETTE) 2 MG Mouth/Throat Lozenge Place 1 lozenge (2 mg total) inside cheek as needed for Smoking cessation. Max 20/day (Patient not taking: Reported on 8/15/2024) 20 each 0     No current facility-administered medications on file prior to visit.     No Known Allergies  Social History     Socioeconomic History    Marital status:      Spouse name: Not on file    Number of  children: 3    Years of education: Not on file    Highest education level: Not on file   Occupational History    Not on file   Tobacco Use    Smoking status: Every Day     Current packs/day: 0.75     Average packs/day: 0.8 packs/day for 43.6 years (32.7 ttl pk-yrs)     Types: Cigarettes     Start date: 1/1/1981    Smokeless tobacco: Never    Tobacco comments:     will try gum or lozenge   Vaping Use    Vaping status: Never Used   Substance and Sexual Activity    Alcohol use: Yes     Alcohol/week: 21.0 standard drinks of alcohol     Types: 21 Cans of beer per week     Comment: 3-4 Beers/d, never a problem    Drug use: Yes     Types: Cannabis     Comment: smokes daily (habit)    Sexual activity: Yes     Comment: , menopause   Other Topics Concern     Service Not Asked    Blood Transfusions Not Asked    Caffeine Concern Yes     Comment: 3 cups per day    Occupational Exposure Not Asked    Hobby Hazards Not Asked    Sleep Concern Not Asked    Stress Concern Not Asked    Weight Concern Not Asked    Special Diet Not Asked    Back Care Not Asked    Exercise Not Asked    Bike Helmet Not Asked    Seat Belt Not Asked    Self-Exams Not Asked   Social History Narrative    Not on file     Social Determinants of Health     Financial Resource Strain: Not on file   Food Insecurity: Not on file   Transportation Needs: Not on file   Physical Activity: Not on file   Stress: Not on file   Social Connections: Not on file   Housing Stability: Not on file     Family History   Problem Relation Age of Onset    Hypertension Mother     Hypertension Maternal Grandmother     Hypertension Maternal Grandfather     Hypertension Paternal Grandmother     Hypertension Paternal Grandfather     Prostate Cancer Paternal Uncle     Prostate Cancer Paternal Uncle     Other (Other) Half-Brother         gout       Review of Systems - All systems reviewed and negative except for HPI    PHYSICAL EXAM:  /70   Pulse 60   Temp 98.4 °F (36.9  °C) (Temporal)   Resp 16   Ht 6' 3\" (1.905 m)   Wt 179 lb (81.2 kg)   SpO2 98%   BMI 22.37 kg/m²   GENERAL APPEARANCE:  Alert, no acute distress, appears stated age  HEENT:  Head- Normocephalic, atraumatic.    Eyes- Extraocular movements intact, pupils equally round and reactive to light,  conjunctivae normal.    Ears- Tympanic membranes intact bilaterally.    Nose- Patent, normal septum and turbinates.    Mouth/Throat- Normal oral mucosa, throat non-erythematous.  NECK:  No submental, submandibular, ant/post cervical lymphadenopathy. No thyromegaly or masses.  PULMONARY:  Lungs clear to auscultation bilaterally. No wheezes, rales, or rhonchi. Normal respiratory effort.  CARDIOVASCULAR:  Regular rate and rhythm. No murmurs, gallops, or rubs.  ABDOMEN:  + bowel sounds, soft, nontender, nondistended. No hepatomegaly.  MUSCULOSKELETAL: Strength of upper and lower extremities 5/5 bilaterally. Normal gait.  NEUROLOGIC:  Cranial nerves 2-12 grossly intact.  PSYCHIATRIC:  Normal mood, affect, and hygiene.   : normal prostate size, no nodules, normal rectal tone. No external hemorrhoids, exam difficult to tell if internal hemorrhoids.      ASSESSMENT/PLAN:    1. Routine medical exam    2. Prostate cancer screening  - PSA, Total (Screening) [E]; Future    3. Benign prostatic hyperplasia with urinary hesitancy  - tadalafil 5 MG Oral Tab; Take 1 tablet (5 mg total) by mouth daily.  Dispense: 30 tablet; Refill: 11    4. Urinary hesitancy  - tadalafil 5 MG Oral Tab; Take 1 tablet (5 mg total) by mouth daily.  Dispense: 30 tablet; Refill: 11    5. Laboratory exam ordered as part of routine general medical examination  - CBC W Differential W Platelet [E]; Future  - Comp Metabolic Panel (14) [E]; Future  - Lipid Panel [E]; Future    6. Asymptomatic microscopic hematuria  - Urinalysis, Routine; Future    7. Rectal bleeding  - Gastro Referral - In Network    8. Pulmonary emphysema, unspecified emphysema type (HCC)  - CT LUNG  LD SCREENING(CPT=71271); Future  - Pulmonary Referral - In Network    9. Cigarette smoker  - CT LUNG LD SCREENING(CPT=71271); Future  - Pulmonary Referral - In Network        Patient verbalized understanding and agrees to plan.      Return in about 1 year (around 8/15/2025) for annual physical, we will contact you with results.

## 2024-08-18 DIAGNOSIS — D64.9 NORMOCYTIC ANEMIA: Primary | ICD-10-CM

## 2024-08-18 LAB
DEPRECATED HBV CORE AB SER IA-ACNC: 38.1 NG/ML
IRON SATN MFR SERPL: 18 %
IRON SERPL-MCNC: 61 UG/DL
TOTAL IRON BINDING CAPACITY: 337 UG/DL (ref 250–425)
TRANSFERRIN SERPL-MCNC: 261 MG/DL (ref 215–365)

## 2024-10-07 ENCOUNTER — TELEMEDICINE (OUTPATIENT)
Dept: FAMILY MEDICINE CLINIC | Facility: CLINIC | Age: 64
End: 2024-10-07
Payer: COMMERCIAL

## 2024-10-07 DIAGNOSIS — Z86.0101 HISTORY OF ADENOMATOUS POLYP OF COLON: ICD-10-CM

## 2024-10-07 DIAGNOSIS — R31.21 ASYMPTOMATIC MICROSCOPIC HEMATURIA: ICD-10-CM

## 2024-10-07 DIAGNOSIS — M21.619 BUNION: ICD-10-CM

## 2024-10-07 DIAGNOSIS — K62.5 BRBPR (BRIGHT RED BLOOD PER RECTUM): ICD-10-CM

## 2024-10-07 DIAGNOSIS — D64.9 NORMOCYTIC ANEMIA: Primary | ICD-10-CM

## 2024-10-07 PROBLEM — D12.5 BENIGN NEOPLASM OF SIGMOID COLON: Status: RESOLVED | Noted: 2020-10-09 | Resolved: 2024-10-07

## 2024-10-07 PROBLEM — D12.3 BENIGN NEOPLASM OF TRANSVERSE COLON: Status: RESOLVED | Noted: 2020-10-09 | Resolved: 2024-10-07

## 2024-10-07 NOTE — PROGRESS NOTES
Video Visit- [facility]  This is a telemedicine visit with live, interactive video and audio.     Issa Molina understands and accepts financial responsibility for any deductible, co-insurance and/or co-pays associated with this service for the date of 10/07/24.      Duration of the service: 13 minutes  3:30-3:43PM      Chief Complaint   Patient presents with    Lab Results        HPI:    Normocytic anemia- Hb 12.5 and has been in the 12's since 2021, ferritin 38.1  Last time had blood in the stool was a couple months ago.   C-scope done 11/6/23, told to repeat 5yrs with extended prep, Dr. Peguero, [facility].  + h/o microscopic hematuria with cystoscopy with Dr. Pressley on 4/6/22. Normal repeat in 5 yrs due to smoking.  Still smoking 8-10cigs/d    Not a vegetarian  No family h/o blood d/o like thalasemia, sickle cell.  Not a frequent blood donor.    History 8/15/24:   + rectal bleeding started 10 d ago and 2 mo ago and not associated with diarrhea or constipation. + drops in the toilet bowl    Denies- epistaxis, hematemesis, hemoptysis, melena, hematuria, n/v, fever, chills, abd pain         + bunion and would like to talk to a podiatrist.       Asking for # for pulmonology referral given at the last visit.        Past Medical History:    Asymptomatic microscopic hematuria    normal CT urogram 3/8/21, cystocopy 4/6/22, repeat eval 5 yrs 2/2 smoking per Dr. Pressley    Benign prostatic hyperplasia with urinary hesitancy    ED (erectile dysfunction)    Family history of prostate cancer    2 paternal uncles    Glaucoma    Normocytic anemia    Dr. Landin, iron defic anemia    EDSON on CPAP    AHI 6 RDI 14 Supine AHI 29 non-supine AHI 3 Sao2 Kavon 89%     Wears glasses       Past Surgical History:   Procedure Laterality Date    Colonoscopy  02/16/2015    repeat 5 yrs (tubular adenoma), Dr. Peguero, [facility]    Colonoscopy  10/09/2020    repeat 3 yrs with extended prep,   Sudhir, [facility]    Colonoscopy  11/06/2023    repeat 5yrs with extended prep, Dr. Peguero, [facility]    Cystoscopy,ping  04/06/2022    normal, repeat in 5 yrs, due to smoking, Dr. Pressley    Hemorrhoidectomy,int/ext,simple      Knee arthroscopy Right     torn meniscus    [facility] [place] teeth removed  2011    3 teeth       Family History   Problem Relation Age of Onset    Hypertension Mother     Hypertension Maternal Grandmother     Hypertension Maternal Grandfather     Hypertension Paternal Grandmother     Hypertension Paternal Grandfather     Prostate Cancer Paternal Uncle     Prostate Cancer Paternal Uncle     Other (Other) Half-Brother         gout           Physical Exam:  There were no vitals taken for this visit.    GENERAL APPEARANCE:  Alert, no acute distress, appears stated age  HEENT:  NCAT, EOMI, mucus membranes moist  NECK:  No obvious goiter  PULMONARY:  Speaking in full sentences comfortably, normal work of breathing  ABDOMEN:  not obese  MUSCULOSKELETAL: Sitting upright.   NEUROLOGIC:  Cranial nerves 2-12 grossly intact.  PSYCHIATRIC:  Normal mood, affect, and hygiene.      Assessment/Plan:  1. Normocytic anemia  -diff dx: GI source (AVM, hemorrhoid, neoplasm, vs other), urological, vs other  -reiterated importance of going back to see GI for further evaluation to assess source of his anemia    2. BRBPR (bright red blood per rectum)    3. History of adenomatous polyp of colon    4. Bunion  - Podiatry Referral - In Network    5. Asymptomatic microscopic hematuria  - to repeat eval 5 yrs from last check in 2022 and last UA had lower amount of blood than previous.        No follow-ups on file.      Linda Parish MD      Please note that the following visit was completed using two-way, real-time interactive audio and visual communication. This has been done in good nilay to provide continuity of care in the best interest of the provider-patient relationship, due to the ongoing public health  crisis/national emergency and because of restrictions of visitation. There are limitations of this visit as physical examination was limited.  Appropriate medical decision-making and tests are ordered as detailed in the plan of care above.

## 2024-11-13 ENCOUNTER — HOSPITAL ENCOUNTER (OUTPATIENT)
Dept: GENERAL RADIOLOGY | Age: 64
Discharge: HOME | End: 2024-11-13
Attending: PODIATRIST
Payer: COMMERCIAL

## 2024-11-13 ENCOUNTER — OFFICE VISIT (OUTPATIENT)
Facility: LOCATION | Age: 64
End: 2024-11-13
Payer: COMMERCIAL

## 2024-11-13 DIAGNOSIS — M79.671 RIGHT FOOT PAIN: ICD-10-CM

## 2024-11-13 DIAGNOSIS — M20.5X1 HALLUX LIMITUS OF RIGHT FOOT: ICD-10-CM

## 2024-11-13 DIAGNOSIS — M79.671 RIGHT FOOT PAIN: Primary | ICD-10-CM

## 2024-11-13 DIAGNOSIS — M20.11 HALLUX VALGUS, RIGHT: ICD-10-CM

## 2024-11-13 PROCEDURE — 99204 OFFICE O/P NEW MOD 45 MIN: CPT | Performed by: PODIATRIST

## 2024-11-13 PROCEDURE — 73630 X-RAY EXAM OF FOOT: CPT | Performed by: PODIATRIST

## 2024-11-13 NOTE — PROGRESS NOTES
[facility]  Progress Note  11/13/2024    Issa Molina is a 64 year old male.   Chief Complaint   Patient presents with    New Patient     Patient is here for right foot bunion and would like to look at new orthotics. He has had custom orthotics in the past. He denies any pain in the office today. Right bunion is starting to rub on his shoes.         HPI:     Patient is a pleasant 64-year-old male who is presenting to clinic today for evaluation of right foot bunion.  Patient states that he has began having pain to the inside of his right foot overlying his severe bunion.  Patient has noticed progressive pain, which is starting to rub in all shoe gear.  Patient has had custom orthotics in the past.  He is wanting to discuss options in regards to his bunion as he does not want this to progress.  Denying any other concerns at this time and is presenting today for further evaluation and care.      Allergies: Patient has no known allergies.   Current Outpatient Medications   Medication Sig Dispense Refill    tadalafil 5 MG Oral Tab Take 1 tablet (5 mg total) by mouth daily. 30 tablet 11    travoprost, BAK free, (TRAVATAN Z) 0.004 % Ophthalmic Solution Apply 1 drop to eye nightly. 1 each 5    Vitamin C 500 MG Oral Tab Take 1 tablet (500 mg total) by mouth daily.      Vitamin B-12 250 MCG Oral Tab Take 1 tablet (250 mcg total) by mouth daily.      vitamin E 200 UNITS Oral Cap Take 1 capsule (200 Units total) by mouth daily.      PEG 3350-KCl-Na Bicarb-NaCl 420 g Oral Recon Soln Take as directed by your doctor (Patient not taking: Reported on 11/13/2024) 4000 mL 0    nicotine polacrilex (NICORETTE) 2 MG Mouth/Throat Lozenge Place 1 lozenge (2 mg total) inside cheek as needed for Smoking cessation. Max 20/day (Patient not taking: Reported on 8/15/2024) 20 each 0      Past Medical History:    Asymptomatic microscopic hematuria    normal CT urogram 3/8/21, cystocopy 4/6/22, repeat eval 5 yrs 2/2 smoking  per Dr. Pressley    Benign prostatic hyperplasia with urinary hesitancy    ED (erectile dysfunction)    Family history of prostate cancer    2 paternal uncles    Glaucoma    Normocytic anemia    Dr. Landin, iron defic anemia    EDSON on CPAP    AHI 6 RDI 14 Supine AHI 29 non-supine AHI 3 Sao2 Kavon 89%     Wears glasses      Past Surgical History:   Procedure Laterality Date    Colonoscopy  02/16/2015    repeat 5 yrs (tubular adenoma), Dr. Peguero, [facility]    Colonoscopy  10/09/2020    repeat 3 yrs with extended prep, Dr. Peguero, [facility]    Colonoscopy  11/06/2023    repeat 5yrs with extended prep, Dr. Peguero, [facility]    Cystoscopy,ping  04/06/2022    normal, repeat in 5 yrs, due to smoking, Dr. Pressley    Hemorrhoidectomy,int/ext,simple      Knee arthroscopy Right     torn meniscus    [place] teeth removed  2011    3 teeth      Family History   Problem Relation Age of Onset    Hypertension Mother     Hypertension Maternal Grandmother     Hypertension Maternal Grandfather     Hypertension Paternal Grandmother     Hypertension Paternal Grandfather     Prostate Cancer Paternal Uncle     Prostate Cancer Paternal Uncle     Other (Other) Half-Brother         gout      Social History     Socioeconomic History    Marital status:     Number of children: 3   Tobacco Use    Smoking status: Every Day     Current packs/day: 0.75     Average packs/day: 0.8 packs/day for 43.9 years (32.9 ttl pk-yrs)     Types: Cigarettes     Start date: 1/1/1981    Smokeless tobacco: Never    Tobacco comments:     will try gum or lozenge   Vaping Use    Vaping status: Never Used   Substance and Sexual Activity    Alcohol use: Yes     Alcohol/week: 21.0 standard drinks of alcohol     Types: 21 Cans of beer per week     Comment: 3-4 Beers/d, never a problem    Drug use: Yes     Types: Cannabis     Comment: smokes daily (habit)    Sexual activity: Yes     Comment: , menopause   Other Topics Concern    Caffeine Concern Yes      Comment: 3 cups per day           REVIEW OF SYSTEMS:     10 point ROS completed and was negative unless stated in HPI.      EXAM:     GENERAL: well developed, well nourished, in no apparent distress  EXTREMITIES:  1. Integument: Skin appears moist, warm, and supple with positive hair growth. There are no color changes. No open lesions. No macerations. No Hyperkeratotic lesions.   2. Vascular: Dorsalis pedis 2/4 bilateral and posterior tibial pulses 2/4 bilateral, capillary refill normal.  3. Neurological: Gross sensation intact via light touch bilaterally.  Normal sharp/dull sensation  4. Musculoskeletal: All muscle groups are graded 5/5 in the foot and ankle.  Large medially deviated first metatarsal with lateral deviation of hallux and medial prominence/exostosis of right first MPJ consistent with hallux valgus.  Patient does have some slight decrease in range of motion of right first MPJ with pain at end range of motion.  Mild crepitus noted right first MPJ     XR FOOT WEIGHTBEARING (3 VIEWS), RIGHT   (CPT=73630)    Result Date: 11/13/2024  CONCLUSION:  Mild to moderate hallucis valgus deformity with degenerative changes of the 1st MTP joint.   LOCATION:  [facility]   Dictated by (CST): Yoav Brothers MD on 11/13/2024 at 12:34 PM     Finalized by (CST): Yoav Brothers MD on 11/13/2024 at 12:35 PM          ASSESSMENT AND PLAN:   Diagnoses and all orders for this visit:    Right foot pain  -     XR FOOT WEIGHTBEARING (3 VIEWS), RIGHT   (CPT=73630); Future    Hallux valgus, right    Hallux limitus of right foot        Plan:   -Patient was seen and evaluated today in clinic.  Chart history reviewed.  Radiographs were obtained today of the right foot.  See above for official impression    Discussed the etiology of this condition as well as both conservative and surgical treatment options.  Discussed conservative measures to include wide shoes, extra depth shoes, padding, offloading, orthotics, anti-inflammatory medication,  and/or steroid injections.  In regards to surgical intervention, the potential procedure was discussed, demonstrated on patients foot.  Due to this condition being of structural origin, only surgical intervention will allow for correction of the deformity.     Long discussion about right first MPJ fusion, in which patient states that he would like to begin the steps to schedule surgery.  Discussed the procedure in detail with patient he does plan to discuss with his family and work.  Will discuss custom orthotics pending surgical intervention    The patient has been advised of the approximate disability involved for these procedures. In addition, the patient has been advised as to the alternatives of care, including continued conservative care as well as surgical procedures. The patient has failed all conservative treatment at this point. The patient understands that if surgical procedures are performed, there are risks and complications that could occur, including but not limited to: hematoma formation, damage to the nervous system, seroma formation, development of a DVT or phlebitis, infection, painful scar tissue formation, stiffness, limited motion, delayed-union, non-union, mal-union, impaired healing, increased chance of swelling, reaction to implanted biomaterials, over-correction, under-correction with recurrence of the deformities, continued pain, loss of limb, loss of life, and the possibility that future surgery may need to be performed. The patient was given the opportunity to ask questions which were answered. The patient voiced no concerns and will consider all these options. Patient desires surgical intervention. No guarantees were given or implied. Pt consented freely to surgical intervention.  I spent approximately 30 minutes discussing the surgical procedures at length. I reviewed in detail the procedure to be performed, postoperative course, disability to be expected, healing time, prognosis and  the importance of their following the recommended postoperative care. Possible complications discussed included but not limited to recurrence of deformity, postoperative pain, swelling, stiffness, rejection of the implant if utilized, return to surgery, infection, residual pain, possible blood clot formation, bleeding, etc. Possible complications relating to over activity and limitations during the postoperative period were reviewed. The patient has responsibilities to help insure successful outcome of the surgery. Postoperative oral and written instructions were reviewed and postoperative course of treatment discussed in detail. Management of postoperative pain was discussed. All questions related to preoperative, operative and postoperative course of treatment were answered to their understanding and satisfaction. RTO and follow up after surgery is necessary and expected and agreed to by the patient. The patient acknowledged understanding of the above and agrees to follow all instructions and protocols. No guarantee or warranty was given or implied as to the results of the surgery.     -All of the patient's questions and concerns were addressed.  They indicated their understanding of these issues and agrees to the plan.    Time spent reviewing pertinent information from patient's chart, reviewing any pertinent imaging, obtaining history and physical exam, discussing and mutually agreeing on a treatment plan, and documenting encounter: 45 minutes    RTC for 1 week postop follow-up      Gallito Varma DPM        [facility]  [address] [address] [address]   [email or web address]            [employer] speech recognition software was used to prepare this note.  Errors in word recognition may occur.  Please contact me with any questions/concerns with this note.

## 2024-11-18 ENCOUNTER — TELEPHONE (OUTPATIENT)
Facility: LOCATION | Age: 64
End: 2024-11-18

## 2024-11-18 DIAGNOSIS — M20.5X1 HALLUX LIMITUS OF RIGHT FOOT: Primary | ICD-10-CM

## 2024-11-18 DIAGNOSIS — M79.671 RIGHT FOOT PAIN: ICD-10-CM

## 2024-11-18 DIAGNOSIS — M20.11 HALLUX VALGUS, RIGHT: ICD-10-CM

## 2024-11-18 NOTE — TELEPHONE ENCOUNTER
[facility] [phone] to schedule surgery. Informed patient of available date at [facility] being 12/12. Asked patient to reach me directly to confirm availability for this date.

## 2024-11-18 NOTE — TELEPHONE ENCOUNTER
Procedure: Right first metatarsal phalangeal joint arthrodesis  CPT code: 21064  Length of Surgery: 1.5 hours  Any Instruments/Hardware: Mini C arm, Yasmani hardware for first MPJ arthrodesis  Call patient: ASAP (patient is unsure when he would like to move forward with surgery)  Anesthesia: MAC  Location: [facility]  Assistance: none  Pacemaker: No  Anticoagulants: No  Nickel Allergy: No  Latex Allergy: No  Diagnosis/ICD Code:   Hallux limitus, right foot  Hallux valgus, right foot  Right foot pain

## 2024-11-25 DIAGNOSIS — M20.5X1 HALLUX LIMITUS OF RIGHT FOOT: Primary | ICD-10-CM

## 2024-11-25 DIAGNOSIS — M20.11 HALLUX VALGUS, RIGHT: ICD-10-CM

## 2024-11-25 DIAGNOSIS — M79.671 RIGHT FOOT PAIN: ICD-10-CM

## 2024-12-06 PROBLEM — Z86.0101 PERSONAL HISTORY OF ADENOMATOUS AND SERRATED COLON POLYPS: Status: ACTIVE | Noted: 2024-12-06

## 2024-12-06 PROBLEM — D12.3 BENIGN NEOPLASM OF TRANSVERSE COLON: Status: ACTIVE | Noted: 2024-12-06

## 2024-12-06 PROBLEM — D50.9 IRON DEFICIENCY ANEMIA, UNSPECIFIED: Status: ACTIVE | Noted: 2024-12-06

## 2024-12-06 PROBLEM — K92.1 HEMATOCHEZIA: Status: ACTIVE | Noted: 2024-12-06

## 2024-12-06 PROBLEM — D12.2 BENIGN NEOPLASM OF ASCENDING COLON: Status: ACTIVE | Noted: 2024-12-06

## 2025-07-14 ENCOUNTER — TELEPHONE (OUTPATIENT)
Dept: FAMILY MEDICINE CLINIC | Facility: CLINIC | Age: 65
End: 2025-07-14

## 2025-07-14 DIAGNOSIS — Z12.5 SPECIAL SCREENING FOR MALIGNANT NEOPLASM OF PROSTATE: ICD-10-CM

## 2025-07-14 DIAGNOSIS — Z00.00 LABORATORY EXAMINATION ORDERED AS PART OF A ROUTINE GENERAL MEDICAL EXAMINATION: Primary | ICD-10-CM

## 2025-07-14 DIAGNOSIS — R31.21 ASYMPTOMATIC MICROSCOPIC HEMATURIA: ICD-10-CM

## 2025-07-14 NOTE — TELEPHONE ENCOUNTER
Patient would like to have lab orders placed in the system prior to his upcoming appointment.  Future Appointments   Date Time Provider Department Center   8/25/2025  1:00 PM Linda Parish MD [facility] [address] [facility] [address]

## 2025-08-25 ENCOUNTER — LAB ENCOUNTER (OUTPATIENT)
Dept: LAB | Age: 65
End: 2025-08-25
Attending: FAMILY MEDICINE

## 2025-08-25 ENCOUNTER — OFFICE VISIT (OUTPATIENT)
Dept: FAMILY MEDICINE CLINIC | Facility: CLINIC | Age: 65
End: 2025-08-25

## 2025-08-25 VITALS
BODY MASS INDEX: 22.63 KG/M2 | WEIGHT: 182 LBS | TEMPERATURE: 98 F | SYSTOLIC BLOOD PRESSURE: 122 MMHG | DIASTOLIC BLOOD PRESSURE: 82 MMHG | HEIGHT: 75 IN | OXYGEN SATURATION: 98 % | HEART RATE: 69 BPM | RESPIRATION RATE: 16 BRPM

## 2025-08-25 DIAGNOSIS — Z12.5 SPECIAL SCREENING FOR MALIGNANT NEOPLASM OF PROSTATE: ICD-10-CM

## 2025-08-25 DIAGNOSIS — R39.11 URINARY HESITANCY: ICD-10-CM

## 2025-08-25 DIAGNOSIS — N40.1 BENIGN PROSTATIC HYPERPLASIA WITH URINARY HESITANCY: ICD-10-CM

## 2025-08-25 DIAGNOSIS — Z00.00 LABORATORY EXAMINATION ORDERED AS PART OF A ROUTINE GENERAL MEDICAL EXAMINATION: ICD-10-CM

## 2025-08-25 DIAGNOSIS — R39.11 BENIGN PROSTATIC HYPERPLASIA WITH URINARY HESITANCY: ICD-10-CM

## 2025-08-25 DIAGNOSIS — Z72.0 SMOKING TRYING TO QUIT: ICD-10-CM

## 2025-08-25 DIAGNOSIS — Z00.00 ROUTINE MEDICAL EXAM: Primary | ICD-10-CM

## 2025-08-25 DIAGNOSIS — R31.21 ASYMPTOMATIC MICROSCOPIC HEMATURIA: ICD-10-CM

## 2025-08-25 DIAGNOSIS — Z12.5 PROSTATE CANCER SCREENING: ICD-10-CM

## 2025-08-25 PROBLEM — D12.3 BENIGN NEOPLASM OF TRANSVERSE COLON: Status: RESOLVED | Noted: 2024-12-06 | Resolved: 2025-08-25

## 2025-08-25 PROBLEM — F17.210 SMOKING GREATER THAN 40 PACK YEARS: Status: ACTIVE | Noted: 2024-11-04

## 2025-08-25 PROBLEM — J41.0 SIMPLE CHRONIC BRONCHITIS (HCC): Status: ACTIVE | Noted: 2024-11-04

## 2025-08-25 PROBLEM — J43.2 CENTRILOBULAR EMPHYSEMA (HCC): Status: ACTIVE | Noted: 2024-11-04

## 2025-08-25 PROBLEM — D12.2 BENIGN NEOPLASM OF ASCENDING COLON: Status: RESOLVED | Noted: 2024-12-06 | Resolved: 2025-08-25

## 2025-08-25 PROBLEM — Z86.0101 PERSONAL HISTORY OF ADENOMATOUS AND SERRATED COLON POLYPS: Status: RESOLVED | Noted: 2024-12-06 | Resolved: 2025-08-25

## 2025-08-25 LAB
ALBUMIN SERPL-MCNC: 4.6 G/DL (ref 3.2–4.8)
ALBUMIN/GLOB SERPL: 1.5 (ref 1–2)
ALP LIVER SERPL-CCNC: 49 U/L (ref 45–117)
ALT SERPL-CCNC: 11 U/L (ref 10–49)
ANION GAP SERPL CALC-SCNC: 12 MMOL/L (ref 0–18)
AST SERPL-CCNC: 25 U/L (ref ?–34)
BASOPHILS # BLD AUTO: 0.04 X10(3) UL (ref 0–0.2)
BASOPHILS NFR BLD AUTO: 0.7 %
BILIRUB SERPL-MCNC: 0.4 MG/DL (ref 0.2–1.1)
BILIRUB UR QL STRIP.AUTO: NEGATIVE
BUN BLD-MCNC: 11 MG/DL (ref 9–23)
CALCIUM BLD-MCNC: 9.5 MG/DL (ref 8.7–10.6)
CHLORIDE SERPL-SCNC: 108 MMOL/L (ref 98–112)
CHOLEST SERPL-MCNC: 137 MG/DL (ref ?–200)
CLARITY UR REFRACT.AUTO: CLEAR
CO2 SERPL-SCNC: 23 MMOL/L (ref 21–32)
COMPLEXED PSA SERPL-MCNC: 0.84 NG/ML (ref ?–4)
CREAT BLD-MCNC: 1.14 MG/DL (ref 0.7–1.3)
EGFRCR SERPLBLD CKD-EPI 2021: 73 ML/MIN/1.73M2 (ref 60–?)
EOSINOPHIL # BLD AUTO: 0.13 X10(3) UL (ref 0–0.7)
EOSINOPHIL NFR BLD AUTO: 2.1 %
ERYTHROCYTE [DISTWIDTH] IN BLOOD BY AUTOMATED COUNT: 14.5 %
FASTING PATIENT LIPID ANSWER: YES
FASTING STATUS PATIENT QL REPORTED: YES
GLOBULIN PLAS-MCNC: 3 G/DL (ref 2–3.5)
GLUCOSE BLD-MCNC: 119 MG/DL (ref 70–99)
GLUCOSE UR STRIP.AUTO-MCNC: NORMAL MG/DL
HCT VFR BLD AUTO: 40 % (ref 39–53)
HDLC SERPL-MCNC: 67 MG/DL (ref 40–59)
HGB BLD-MCNC: 12.8 G/DL (ref 13–17.5)
IMM GRANULOCYTES # BLD AUTO: 0.03 X10(3) UL (ref 0–1)
IMM GRANULOCYTES NFR BLD: 0.5 %
KETONES UR STRIP.AUTO-MCNC: NEGATIVE MG/DL
LDLC SERPL CALC-MCNC: 60 MG/DL (ref ?–100)
LEUKOCYTE ESTERASE UR QL STRIP.AUTO: NEGATIVE
LYMPHOCYTES # BLD AUTO: 2.43 X10(3) UL (ref 1–4)
LYMPHOCYTES NFR BLD AUTO: 39.8 %
MCH RBC QN AUTO: 27.9 PG (ref 26–34)
MCHC RBC AUTO-ENTMCNC: 32 G/DL (ref 31–37)
MCV RBC AUTO: 87.1 FL (ref 80–100)
MONOCYTES # BLD AUTO: 0.66 X10(3) UL (ref 0.1–1)
MONOCYTES NFR BLD AUTO: 10.8 %
NEUTROPHILS # BLD AUTO: 2.82 X10 (3) UL (ref 1.5–7.7)
NEUTROPHILS # BLD AUTO: 2.82 X10(3) UL (ref 1.5–7.7)
NEUTROPHILS NFR BLD AUTO: 46.1 %
NITRITE UR QL STRIP.AUTO: NEGATIVE
NONHDLC SERPL-MCNC: 70 MG/DL (ref ?–130)
OSMOLALITY SERPL CALC.SUM OF ELEC: 297 MOSM/KG (ref 275–295)
PH UR STRIP.AUTO: 5 (ref 5–8)
PLATELET # BLD AUTO: 264 10(3)UL (ref 150–450)
POTASSIUM SERPL-SCNC: 4.3 MMOL/L (ref 3.5–5.1)
PROT SERPL-MCNC: 7.6 G/DL (ref 5.7–8.2)
PROT UR STRIP.AUTO-MCNC: NEGATIVE MG/DL
RBC # BLD AUTO: 4.59 X10(6)UL (ref 4.3–5.7)
SODIUM SERPL-SCNC: 143 MMOL/L (ref 136–145)
SP GR UR STRIP.AUTO: 1.02 (ref 1–1.03)
TRIGL SERPL-MCNC: 44 MG/DL (ref 30–149)
UROBILINOGEN UR STRIP.AUTO-MCNC: NORMAL MG/DL
VLDLC SERPL CALC-MCNC: 6 MG/DL (ref 0–30)
WBC # BLD AUTO: 6.1 X10(3) UL (ref 4–11)

## 2025-08-25 PROCEDURE — 84153 ASSAY OF PSA TOTAL: CPT | Performed by: FAMILY MEDICINE

## 2025-08-25 PROCEDURE — 85025 COMPLETE CBC W/AUTO DIFF WBC: CPT | Performed by: FAMILY MEDICINE

## 2025-08-25 PROCEDURE — 80061 LIPID PANEL: CPT | Performed by: FAMILY MEDICINE

## 2025-08-25 PROCEDURE — 81001 URINALYSIS AUTO W/SCOPE: CPT | Performed by: FAMILY MEDICINE

## 2025-08-25 PROCEDURE — 80053 COMPREHEN METABOLIC PANEL: CPT | Performed by: FAMILY MEDICINE

## 2025-08-25 RX ORDER — TIMOLOL MALEATE 5 MG/ML
1 SOLUTION/ DROPS OPHTHALMIC DAILY
COMMUNITY
Start: 2025-06-16

## 2025-08-25 RX ORDER — TADALAFIL 5 MG/1
5 TABLET ORAL DAILY
Qty: 30 TABLET | Refills: 5 | Status: SHIPPED | OUTPATIENT
Start: 2025-08-25

## (undated) DEVICE — GOWN,SIRUS,FABRIC-REINFORCED,LARGE: Brand: MEDLINE

## (undated) DEVICE — KENDALL SCD EXPRESS SLEEVES, KNEE LENGTH, MEDIUM: Brand: KENDALL SCD

## (undated) DEVICE — GLOVE SURG SENSICARE SZ 7-1/2

## (undated) DEVICE — ZIMMER® STERILE DISPOSABLE TOURNIQUET CUFF WITH PLC, DUAL PORT, SINGLE BLADDER, 34 IN. (86 CM)

## (undated) DEVICE — 10K/24K ARTHROSCOPY INFLOW TUBE SET: Brand: 10K/24K

## (undated) DEVICE — PADDING CAST COTTON  4

## (undated) DEVICE — GLOVE SURG TRIUMPH SZ 8

## (undated) DEVICE — GLOVE SURG SENSICARE SZ 8

## (undated) DEVICE — [AGGRESSIVE PLUS CUTTER, ARTHROSCOPIC SHAVER BLADE,  DO NOT RESTERILIZE,  DO NOT USE IF PACKAGE IS DAMAGED,  KEEP DRY,  KEEP AWAY FROM SUNLIGHT]: Brand: FORMULA

## (undated) DEVICE — STERILE POLYISOPRENE POWDER-FREE SURGICAL GLOVES: Brand: PROTEXIS

## (undated) DEVICE — NON-ADHERENT STRIPS,OIL EMULSION: Brand: CURITY

## (undated) DEVICE — GLOVE SURG SENSICARE SZ 8-1/2

## (undated) DEVICE — SOL  .9 3000ML

## (undated) DEVICE — KNEE ARTHROSCOPY CDS: Brand: MEDLINE INDUSTRIES, INC.

## (undated) NOTE — LETTER
April 30, 2017    Patient: Damian Bah   Date of Visit: 4/30/2017       To Whom It May Concern:    Damian Bah was seen and treated in our emergency department on 4/30/2017. He should not return to work until Tuesday, May 2, 2017.     If you have any

## (undated) NOTE — ED AVS SNAPSHOT
THE [facility] Emergency Department in [address] [facility]    Phone:  [phone]    Fax:  [phone]           Mateo Chan   MRN: [id number]    Department:  THE [facility] Emergency Department in [place]   Date of Visit:  4 You can get these medications from any pharmacy     Bring a paper prescription for each of these medications    - HYDROcodone-acetaminophen 5-325 MG Tabs  - ibuprofen 600 MG Tabs              Discharge Instructions       Ace wrap, knee immobilizer and crut primary care or specialist physician will see patients referred from the [facility] Emergency Department. Follow-up care is at the discretion of that Physician.     IF THERE IS ANY CHANGE OR WORSENING OF YOUR CONDITION, CALL YOUR PRIMARY CARE PHYSICIAN harming yourself, contact [address] [facility] at [phone]. - If you don’t have insurance, Braulio Posadas has partnered with Patient [address] to help you get signed up for insurance coverage.   Patient Currie Call [phone] for help. [employer] is NOT to be used for urgent needs. For medical emergencies, dial 911.

## (undated) NOTE — Clinical Note
Patient Name: Myrna Jimenez  YOB: 1960          MRN number:  [id number]  Date:  5/31/2017  Referring Physician:  Pieter Dixon         LOWER EXTREMITY EVALUATION:    Referring Physician:   Diagnosis: Right Knee Arthroscope     Date tibial ER with stance and ambulation. Gait: The patient ambulates with decreased talocrual dorsiflexion and rolling through the longitudinal arch/    Palpation: Increased pain to palpation at the incisions.   Has a generalized increase in edema and effusi Patient was advised of these findings, precautions, and treatment options and has agreed to actively participate in planning and for this course of care.     Thank you for your referral.  If you have any questions, please contact me at Dept: [phone]

## (undated) NOTE — LETTER
Last Revised 02/07/06  Obstructive Sleep Apnea Questionnaire    Clinical signs and symptoms suggesting the possibility of EDSON    1. Predisposing physical characteristics (positive with any of the following present)  ? BMI 35kg/m²  ?  Craniofacial abnormalit pauses which are frightening to the observer, patient regularly falls asleep within minutes after being left unstimulated) in which case they should be treated as though they have severe sleep apnea.     The sleep laboratory’s assessment (none, mild, modera Point Total for B           C. Requirement for postoperative opioids.                Opioid requirement             Points   None 0    Low dose oral opiod 1    High dose oral opioids, parenteral or neuraxial opiods 3      Point Total for C        Estimation

## (undated) NOTE — LETTER
February 3, 2022      Lucia balder[place] [id number]      Dear JARED:    Our office has been trying to contact you to discuss your recent test results. Dr. Anjana Vines would like you to schedule appointment to discuss lab results. Please call office at [phone] to schedule. As always, thank you for selecting [facility] for your healthcare needs.     Sincerely,    Your Physician & Nursing Staff

## (undated) NOTE — IP AVS SNAPSHOT
[facility] [place] [address] [place], [address] ~ [phone]                Discharge Summary   5/15/2017    Duc Rodriguez           Admission Information        Provider Department    5/15/2017 Merline Mitchell, MD  Pallavi Cheng / Ruthie Rangel Take the pain medication with food. You may substitute Tylenol for your pain medicine as desired. Please DO NOT take plain tylenol in addition to the prescription pain medication, as the prescription pain medication (Norco) contains tylenol.   You may take [phone] the day after surgery to obtain a follow-up appointment 5-10 days after surgery, if not already arranged. • If you develop any fever (101 degrees or above), unexpected redness or swelling in your leg, please notify me in the office.   • If y Follow up with Arthur Koyanagi, MD. Schedule an appointment as soon as possible for a visit in 1 week.     Specialties:  [address], Sports Medicine    Contact information:    [address] [phone] harming yourself, contact [facility] at [phone]. - If you don’t have insurance, Braulio Posadas has partnered with Patient [address] to help you get signed up for insurance coverage.   Patient Sar[place]

## (undated) NOTE — MR AVS SNAPSHOT
[facility] [address] [place] [address] [address] [facility]  Patrice Marie 107 [phone]               Thank you for choosing us for your health care visit with Laureen Kenny MD.  We are glad to serve you and happy to provide you wit Apply 1 drop to eye nightly.    Commonly known as:  TRAVATAN TATE                Where to Get Your Medications      You can get these medications from any pharmacy     Bring a paper prescription for each of these medications    - HYDROcodone-acetaminophen 5-32

## (undated) NOTE — MR AVS SNAPSHOT
[facility] [address] [place] [address] [address] [facility]  Patrice Marie 107 [phone]               Thank you for choosing us for your health care visit with Elsie Matos MD.  We are glad to serve you and happy to provide you wit authorization, such as [place], please feel free to schedule your appointment immediately. However, if you are unsure about the requirements for authorization, please wait 5-7 days and then contact your physician's office.  At that time, you will Summaries. If you've been to the Emergency Department or your doctor's office, you can view your past visit information in [employer] by going to Visits < Visit Summaries. [employer] questions? Call [phone] for help.   [employer] is NOT to be used for urge

## (undated) NOTE — MR AVS SNAPSHOT
[address] [phone]               Thank you for choosing us for your health care visit with jareth ha PT. We are glad to serve you and happy to provide you with this summary of your visit.   Ple You can access your MyChart to more actively manage your health care and view more details from this visit by going to [email or web address].   If you've recently had a stay at the Hospital you can access your discharge instructions in [address] by catherine

## (undated) NOTE — ED AVS SNAPSHOT
[facility] Emergency Department in [address] [facility]    Phone:  [phone]    Fax:  [phone]           [place]   MRN: [id number]    Department:  [facility] Emergency Department in [place]   Date of Visit:  4 IF THERE IS ANY CHANGE OR WORSENING OF YOUR CONDITION, CALL YOUR PRIMARY CARE PHYSICIAN AT ONCE OR RETURN IMMEDIATELY TO THE EMERGENCY DEPARTMENT.     If you have been prescribed any medication(s), please fill your prescription right away and begin taking t

## (undated) NOTE — MR AVS SNAPSHOT
[address]               Thank you for choosing us for your health care visit with jareth ha PT. We are glad to serve you and happy to provide you with this summary of your visit.   Ple discharge instructions in [employer] by going to Visits < Admission Summaries. If you've been to the Emergency Department or your doctor's office, you can view your past visit information in [employer] by going to Visits < Visit Summaries. [employer] questions?

## (undated) NOTE — MR AVS SNAPSHOT
[address]               Thank you for choosing us for your health care visit with jareth ha PT. We are glad to serve you and happy to provide you with this summary of your visit.   Ple Commonly known as:  NORCO           Travoprost (LUISANA Free) 0.004 % Soln   Apply 1 drop to eye nightly. Commonly known as:  TRAVATAN Z           Vitamin B-12 250 MCG Tabs   Take 250 mcg by mouth daily.    Commonly known as:  VITAMIN B12           Vitamin C